# Patient Record
Sex: MALE | Race: WHITE | NOT HISPANIC OR LATINO | Employment: PART TIME | URBAN - METROPOLITAN AREA
[De-identification: names, ages, dates, MRNs, and addresses within clinical notes are randomized per-mention and may not be internally consistent; named-entity substitution may affect disease eponyms.]

---

## 2018-02-26 ENCOUNTER — OFFICE VISIT (OUTPATIENT)
Dept: RETAIL CLINIC | Facility: CLINIC | Age: 23
End: 2018-02-26

## 2018-02-26 VITALS
OXYGEN SATURATION: 98 % | DIASTOLIC BLOOD PRESSURE: 50 MMHG | HEART RATE: 59 BPM | SYSTOLIC BLOOD PRESSURE: 98 MMHG | RESPIRATION RATE: 18 BRPM | TEMPERATURE: 98.4 F

## 2018-02-26 DIAGNOSIS — H60.393 ACUTE INFECTIVE OTITIS EXTERNA, BILATERAL: ICD-10-CM

## 2018-02-26 DIAGNOSIS — H66.93 ACUTE OTITIS MEDIA, BILATERAL: Primary | ICD-10-CM

## 2018-02-26 PROCEDURE — 99203 OFFICE O/P NEW LOW 30 MIN: CPT | Performed by: NURSE PRACTITIONER

## 2018-02-26 PROCEDURE — 69209 REMOVE IMPACTED EAR WAX UNI: CPT | Performed by: NURSE PRACTITIONER

## 2018-02-26 RX ORDER — AMOXICILLIN 875 MG/1
875 TABLET, COATED ORAL EVERY 12 HOURS SCHEDULED
Qty: 20 TABLET | Refills: 0 | Status: SHIPPED | OUTPATIENT
Start: 2018-02-26 | End: 2018-03-08

## 2018-02-26 RX ORDER — OFLOXACIN 3 MG/ML
10 SOLUTION AURICULAR (OTIC) DAILY
Qty: 10 ML | Refills: 0 | Status: SHIPPED | OUTPATIENT
Start: 2018-02-26 | End: 2018-03-05

## 2018-02-26 NOTE — PROGRESS NOTES
Procedure   Ear Cerumen Removal Instrumentation  Date/Time: 2/26/2018 4:53 PM  Performed by: NANETTE AMAYA  Authorized by: NANETTE AMAYA   Consent: Verbal consent obtained. Written consent not obtained.  Risks and benefits: risks, benefits and alternatives were discussed  Consent given by: patient  Patient understanding: patient states understanding of the procedure being performed  Patient consent: the patient's understanding of the procedure matches consent given  Patient identity confirmed: verbally with patient and provided demographic data    Anesthesia:  Local Anesthetic: none  Cerumenolytics applied prior to procedure: patient used some prior to visit.  Location details: left ear and right ear  Procedure type: irrigation  Procedure type comments: warm water    Sedation:  Patient sedated: no  Patient tolerance: Patient tolerated the procedure well with no immediate complications  Comments: Unable to irrigate all cerumen out of bilateral ears. Left canal has about 1/4 amount of cerumen that remains, right canal has about 1/2 amount of cerumen that remains. Advised to make an appt. With ENT to remove remaining and to f/u on infection. Patient verbalizes understanding.

## 2018-02-26 NOTE — PATIENT INSTRUCTIONS
"Otitis Externa  Otitis externa is an infection of the outer ear canal. The outer ear canal is the area between the outside of the ear and the eardrum. Otitis externa is sometimes called \"swimmer's ear.\"  What are the causes?  This condition may be caused by:  · Swimming in dirty water.  · Moisture in the ear.  · An injury to the inside of the ear.  · An object stuck in the ear.  · A cut or scrape on the outside of the ear.  What increases the risk?  This condition is more likely to develop in swimmers.  What are the signs or symptoms?  The first symptom of this condition is often itching in the ear. Later signs and symptoms include:  · Swelling of the ear.  · Redness in the ear.  · Ear pain. The pain may get worse when you pull on your ear.  · Pus coming from the ear.  How is this diagnosed?  This condition may be diagnosed by examining the ear and testing fluid from the ear for bacteria and funguses.  How is this treated?  This condition may be treated with:  · Antibiotic ear drops. These are often given for 10-14 days.  · Medicine to reduce itching and swelling.  Follow these instructions at home:  · If you were prescribed antibiotic ear drops, apply them as told by your health care provider. Do not stop using the antibiotic even if your condition improves.  · Take over-the-counter and prescription medicines only as told by your health care provider.  · Keep all follow-up visits as told by your health care provider. This is important.  How is this prevented?  · Keep your ear dry. Use the corner of a towel to dry your ear after you swim or bathe.  · Avoid scratching or putting things in your ear. Doing these things can damage the ear canal or remove the protective wax that lines it, which makes it easier for bacteria and funguses to grow.  · Avoid swimming in lakes, polluted water, or pools that may not have the right amount of chlorine.  · Consider making ear drops and putting 3 or 4 drops in each ear after you " swim. Ask your health care provider about how you can make ear drops.  Contact a health care provider if:  · You have a fever.  · After 3 days your ear is still red, swollen, painful, or draining pus.  · Your redness, swelling, or pain gets worse.  · You have a severe headache.  · You have redness, swelling, pain, or tenderness in the area behind your ear.  This information is not intended to replace advice given to you by your health care provider. Make sure you discuss any questions you have with your health care provider.  Document Released: 12/18/2006 Document Revised: 01/24/2017 Document Reviewed: 09/26/2016  [employer] Interactive Patient Education © 2017 [employer]  Otitis Media, Adult  Otitis media occurs when there is inflammation and fluid in the middle ear. Your middle ear is a part of the ear that contains bones for hearing as well as air that helps send sounds to your brain.  What are the causes?  This condition is caused by a blockage in the eustachian tube. This tube drains fluid from the ear to the back of the nose (nasopharynx). A blockage in this tube can be caused by an object or by swelling (edema) in the tube. Problems that can cause a blockage include:  · A cold or other upper respiratory infection.  · Allergies.  · An irritant, such as tobacco smoke.  · Enlarged adenoids. The adenoids are areas of soft tissue located high in the back of the throat, behind the nose and the roof of the mouth.  · A mass in the nasopharynx.  · Damage to the ear caused by pressure changes (barotrauma).  What are the signs or symptoms?  Symptoms of this condition include:  · Ear pain.  · A fever.  · Decreased hearing.  · A headache.  · Tiredness (lethargy).  · Fluid leaking from the ear.  · Ringing in the ear.  How is this diagnosed?  This condition is diagnosed with a physical exam. During the exam your health care provider will use an instrument called an otoscope to look into your ear and check for redness,  swelling, and fluid. He or she will also ask about your symptoms.  Your health care provider may also order tests, such as:  · A test to check the movement of the eardrum (pneumatic otoscopy). This test is done by squeezing a small amount of air into the ear.  · A test that changes air pressure in the middle ear to check how well the eardrum moves and whether the eustachian tube is working (tympanogram).  How is this treated?  This condition usually goes away on its own within 3-5 days. But if the condition is caused by a bacteria infection and does not go away own its own, or keeps coming back, your health care provider may:  · Prescribe antibiotic medicines to treat the infection.  · Prescribe or recommend medicines to control pain.  Follow these instructions at home:  · Take over-the-counter and prescription medicines only as told by your health care provider.  · If you were prescribed an antibiotic medicine, take it as told by your health care provider. Do not stop taking the antibiotic even if you start to feel better.  · Keep all follow-up visits as told by your health care provider. This is important.  Contact a health care provider if:  · You have bleeding from your nose.  · There is a lump on your neck.  · You are not getting better in 5 days.  · You feel worse instead of better.  Get help right away if:  · You have severe pain that is not controlled with medicine.  · You have swelling, redness, or pain around your ear.  · You have stiffness in your neck.  · A part of your face is paralyzed.  · The bone behind your ear (mastoid) is tender when you touch it.  · You develop a severe headache.  Summary  · Otitis media is redness, soreness, and swelling of the middle ear.  · This condition usually goes away on its own within 3-5 days.  · If the problem does not go away in 3-5 days, your health care provider may prescribe or recommend medicines to treat your symptoms.  · If you were prescribed an antibiotic  medicine, take it as told by your health care provider.  This information is not intended to replace advice given to you by your health care provider. Make sure you discuss any questions you have with your health care provider.  Document Released: 09/22/2005 Document Revised: 12/08/2017 Document Reviewed: 12/08/2017  [employer] Interactive Patient Education © 2017 [employer]

## 2018-02-26 NOTE — PROGRESS NOTES
Subjective   Patient ID: Mando Hawk is a 23 y.o. male presents with   Chief Complaint   Patient presents with   • Earache     x 2 days   • Nasal Congestion     post nasal drip        Earache    There is pain in both ears. This is a new problem. The current episode started in the past 7 days. The problem occurs constantly. The problem has been gradually worsening. There has been no fever. The pain is at a severity of 3/10. Associated symptoms include coughing and headaches (occasionally). Pertinent negatives include no rhinorrhea or sore throat. He has tried nothing for the symptoms. The treatment provided no relief. There is no history of a chronic ear infection, hearing loss or a tympanostomy tube.       No Known Allergies    The following portions of the patient's history were reviewed and updated as appropriate: allergies, current medications, past family history, past medical history, past social history, past surgical history and problem list.      Review of Systems   Constitutional: Positive for fatigue. Negative for chills, diaphoresis and fever.   HENT: Positive for congestion, ear pain, postnasal drip, sinus pain and sinus pressure. Negative for rhinorrhea, sneezing and sore throat.    Respiratory: Positive for cough. Negative for chest tightness, shortness of breath and wheezing.    Cardiovascular: Negative.    Gastrointestinal: Negative.    Neurological: Positive for headaches (occasionally).       Objective     Vitals:    02/26/18 1610   BP: 98/50   Pulse: 59   Resp: 18   Temp: 98.4 °F (36.9 °C)   SpO2: 98%         Physical Exam   Constitutional: He is oriented to person, place, and time. He appears well-developed and well-nourished. He does not appear ill. No distress.   HENT:   Head: Normocephalic.   Right Ear: External ear normal. Tympanic membrane is erythematous. Decreased hearing (mild) is noted.   Left Ear: External ear normal. Tympanic membrane is erythematous. Decreased hearing (mild) is noted.  "  Nose: Mucosal edema present. No rhinorrhea or sinus tenderness.   Mouth/Throat: Oropharynx is clear and moist and mucous membranes are normal. Tonsils are 2+ on the right. Tonsils are 2+ on the left. No tonsillar exudate.   Bilateral canals with scant white exudate, tenderness, erythema and mild edema.  Hearing improved post irrigation   Eyes: Conjunctivae are normal.   Sclera white.   Neck: No tracheal deviation present.   Cardiovascular: Normal rate, regular rhythm, S1 normal, S2 normal and normal heart sounds.    Pulmonary/Chest: Effort normal and breath sounds normal. No accessory muscle usage. No respiratory distress.   Abdominal: Soft. Bowel sounds are normal. There is no tenderness.   Lymphadenopathy:     He has no cervical adenopathy.   Neurological: He is alert and oriented to person, place, and time.   Skin: Skin is warm and dry.   Vitals reviewed.        Mando was seen today for earache and nasal congestion.    Diagnoses and all orders for this visit:    Acute otitis media, bilateral  -     amoxicillin (AMOXIL) 875 MG tablet; Take 1 tablet by mouth Every 12 (Twelve) Hours for 10 days.  -     Ear Cerumen Removal    Acute infective otitis externa, bilateral  -     ofloxacin (FLOXIN OTIC) 0.3 % otic solution; Administer 10 drops into both ears Daily for 7 days.  -     Ear Cerumen Removal        Follow-up with Primary Care Physician in 48-72 hours if these symptoms worsen or fail to improve as anticipated. Patient verbalizes understanding.    Otitis Externa  Otitis externa is an infection of the outer ear canal. The outer ear canal is the area between the outside of the ear and the eardrum. Otitis externa is sometimes called \"swimmer's ear.\"  What are the causes?  This condition may be caused by:  · Swimming in dirty water.  · Moisture in the ear.  · An injury to the inside of the ear.  · An object stuck in the ear.  · A cut or scrape on the outside of the ear.  What increases the risk?  This condition is " more likely to develop in swimmers.  What are the signs or symptoms?  The first symptom of this condition is often itching in the ear. Later signs and symptoms include:  · Swelling of the ear.  · Redness in the ear.  · Ear pain. The pain may get worse when you pull on your ear.  · Pus coming from the ear.  How is this diagnosed?  This condition may be diagnosed by examining the ear and testing fluid from the ear for bacteria and funguses.  How is this treated?  This condition may be treated with:  · Antibiotic ear drops. These are often given for 10-14 days.  · Medicine to reduce itching and swelling.  Follow these instructions at home:  · If you were prescribed antibiotic ear drops, apply them as told by your health care provider. Do not stop using the antibiotic even if your condition improves.  · Take over-the-counter and prescription medicines only as told by your health care provider.  · Keep all follow-up visits as told by your health care provider. This is important.  How is this prevented?  · Keep your ear dry. Use the corner of a towel to dry your ear after you swim or bathe.  · Avoid scratching or putting things in your ear. Doing these things can damage the ear canal or remove the protective wax that lines it, which makes it easier for bacteria and funguses to grow.  · Avoid swimming in lakes, polluted water, or pools that may not have the right amount of chlorine.  · Consider making ear drops and putting 3 or 4 drops in each ear after you swim. Ask your health care provider about how you can make ear drops.  Contact a health care provider if:  · You have a fever.  · After 3 days your ear is still red, swollen, painful, or draining pus.  · Your redness, swelling, or pain gets worse.  · You have a severe headache.  · You have redness, swelling, pain, or tenderness in the area behind your ear.  This information is not intended to replace advice given to you by your health care provider. Make sure you  discuss any questions you have with your health care provider.  Document Released: 12/18/2006 Document Revised: 01/24/2017 Document Reviewed: 09/26/2016  [employer] Interactive Patient Education © 2017 [employer]  Otitis Media, Adult  Otitis media occurs when there is inflammation and fluid in the middle ear. Your middle ear is a part of the ear that contains bones for hearing as well as air that helps send sounds to your brain.  What are the causes?  This condition is caused by a blockage in the eustachian tube. This tube drains fluid from the ear to the back of the nose (nasopharynx). A blockage in this tube can be caused by an object or by swelling (edema) in the tube. Problems that can cause a blockage include:  · A cold or other upper respiratory infection.  · Allergies.  · An irritant, such as tobacco smoke.  · Enlarged adenoids. The adenoids are areas of soft tissue located high in the back of the throat, behind the nose and the roof of the mouth.  · A mass in the nasopharynx.  · Damage to the ear caused by pressure changes (barotrauma).  What are the signs or symptoms?  Symptoms of this condition include:  · Ear pain.  · A fever.  · Decreased hearing.  · A headache.  · Tiredness (lethargy).  · Fluid leaking from the ear.  · Ringing in the ear.  How is this diagnosed?  This condition is diagnosed with a physical exam. During the exam your health care provider will use an instrument called an otoscope to look into your ear and check for redness, swelling, and fluid. He or she will also ask about your symptoms.  Your health care provider may also order tests, such as:  · A test to check the movement of the eardrum (pneumatic otoscopy). This test is done by squeezing a small amount of air into the ear.  · A test that changes air pressure in the middle ear to check how well the eardrum moves and whether the eustachian tube is working (tympanogram).  How is this treated?  This condition usually goes away on its  own within 3-5 days. But if the condition is caused by a bacteria infection and does not go away own its own, or keeps coming back, your health care provider may:  · Prescribe antibiotic medicines to treat the infection.  · Prescribe or recommend medicines to control pain.  Follow these instructions at home:  · Take over-the-counter and prescription medicines only as told by your health care provider.  · If you were prescribed an antibiotic medicine, take it as told by your health care provider. Do not stop taking the antibiotic even if you start to feel better.  · Keep all follow-up visits as told by your health care provider. This is important.  Contact a health care provider if:  · You have bleeding from your nose.  · There is a lump on your neck.  · You are not getting better in 5 days.  · You feel worse instead of better.  Get help right away if:  · You have severe pain that is not controlled with medicine.  · You have swelling, redness, or pain around your ear.  · You have stiffness in your neck.  · A part of your face is paralyzed.  · The bone behind your ear (mastoid) is tender when you touch it.  · You develop a severe headache.  Summary  · Otitis media is redness, soreness, and swelling of the middle ear.  · This condition usually goes away on its own within 3-5 days.  · If the problem does not go away in 3-5 days, your health care provider may prescribe or recommend medicines to treat your symptoms.  · If you were prescribed an antibiotic medicine, take it as told by your health care provider.  This information is not intended to replace advice given to you by your health care provider. Make sure you discuss any questions you have with your health care provider.  Document Released: 09/22/2005 Document Revised: 12/08/2017 Document Reviewed: 12/08/2017  [employer] Interactive Patient Education © 2017 [employer]

## 2018-12-14 ENCOUNTER — OFFICE VISIT CONVERTED (OUTPATIENT)
Dept: FAMILY MEDICINE CLINIC | Facility: CLINIC | Age: 23
End: 2018-12-14
Attending: NURSE PRACTITIONER

## 2021-05-09 VITALS
WEIGHT: 208 LBS | HEIGHT: 69 IN | OXYGEN SATURATION: 98 % | BODY MASS INDEX: 30.81 KG/M2 | DIASTOLIC BLOOD PRESSURE: 60 MMHG | HEART RATE: 97 BPM | SYSTOLIC BLOOD PRESSURE: 108 MMHG | TEMPERATURE: 98 F

## 2022-03-29 ENCOUNTER — HOSPITAL ENCOUNTER (EMERGENCY)
Facility: HOSPITAL | Age: 27
Discharge: HOME | End: 2022-03-29
Attending: EMERGENCY MEDICINE | Admitting: EMERGENCY MEDICINE

## 2022-03-29 ENCOUNTER — APPOINTMENT (OUTPATIENT)
Dept: CT IMAGING | Facility: HOSPITAL | Age: 27
End: 2022-03-29

## 2022-03-29 ENCOUNTER — APPOINTMENT (OUTPATIENT)
Dept: ULTRASOUND IMAGING | Facility: HOSPITAL | Age: 27
End: 2022-03-29

## 2022-03-29 VITALS
RESPIRATION RATE: 18 BRPM | HEIGHT: 69 IN | SYSTOLIC BLOOD PRESSURE: 138 MMHG | HEART RATE: 63 BPM | BODY MASS INDEX: 34.09 KG/M2 | TEMPERATURE: 98.3 F | WEIGHT: 230.16 LBS | OXYGEN SATURATION: 99 % | DIASTOLIC BLOOD PRESSURE: 66 MMHG

## 2022-03-29 DIAGNOSIS — N50.82 SCROTAL PAIN: Primary | ICD-10-CM

## 2022-03-29 LAB
ALBUMIN SERPL-MCNC: 4.5 G/DL (ref 3.5–5.2)
ALBUMIN/GLOB SERPL: 1.8 G/DL
ALP SERPL-CCNC: 61 U/L (ref 39–117)
ALT SERPL W P-5'-P-CCNC: 65 U/L (ref 1–41)
ANION GAP SERPL CALCULATED.3IONS-SCNC: 12.2 MMOL/L (ref 5–15)
AST SERPL-CCNC: 24 U/L (ref 1–40)
BASOPHILS # BLD AUTO: 0.01 10*3/MM3 (ref 0–0.2)
BASOPHILS NFR BLD AUTO: 0.1 % (ref 0–1.5)
BILIRUB SERPL-MCNC: 0.3 MG/DL (ref 0–1.2)
BILIRUB UR QL STRIP: NEGATIVE
BUN SERPL-MCNC: 19 MG/DL (ref 6–20)
BUN/CREAT SERPL: 20.4 (ref 7–25)
CALCIUM SPEC-SCNC: 9.6 MG/DL (ref 8.6–10.5)
CHLORIDE SERPL-SCNC: 101 MMOL/L (ref 98–107)
CLARITY UR: ABNORMAL
CO2 SERPL-SCNC: 25.8 MMOL/L (ref 22–29)
COLOR UR: YELLOW
CREAT SERPL-MCNC: 0.93 MG/DL (ref 0.76–1.27)
DEPRECATED RDW RBC AUTO: 41.1 FL (ref 37–54)
EGFRCR SERPLBLD CKD-EPI 2021: 114.7 ML/MIN/1.73
EOSINOPHIL # BLD AUTO: 0.36 10*3/MM3 (ref 0–0.4)
EOSINOPHIL NFR BLD AUTO: 3 % (ref 0.3–6.2)
ERYTHROCYTE [DISTWIDTH] IN BLOOD BY AUTOMATED COUNT: 13.2 % (ref 12.3–15.4)
GLOBULIN UR ELPH-MCNC: 2.5 GM/DL
GLUCOSE SERPL-MCNC: 110 MG/DL (ref 65–99)
GLUCOSE UR STRIP-MCNC: NEGATIVE MG/DL
HCT VFR BLD AUTO: 44.9 % (ref 37.5–51)
HGB BLD-MCNC: 15.5 G/DL (ref 13–17.7)
HGB UR QL STRIP.AUTO: NEGATIVE
IMM GRANULOCYTES # BLD AUTO: 0.04 10*3/MM3 (ref 0–0.05)
IMM GRANULOCYTES NFR BLD AUTO: 0.3 % (ref 0–0.5)
KETONES UR QL STRIP: NEGATIVE
LEUKOCYTE ESTERASE UR QL STRIP.AUTO: NEGATIVE
LYMPHOCYTES # BLD AUTO: 4.15 10*3/MM3 (ref 0.7–3.1)
LYMPHOCYTES NFR BLD AUTO: 34.7 % (ref 19.6–45.3)
MCH RBC QN AUTO: 29.5 PG (ref 26.6–33)
MCHC RBC AUTO-ENTMCNC: 34.5 G/DL (ref 31.5–35.7)
MCV RBC AUTO: 85.4 FL (ref 79–97)
MONOCYTES # BLD AUTO: 1.13 10*3/MM3 (ref 0.1–0.9)
MONOCYTES NFR BLD AUTO: 9.4 % (ref 5–12)
NEUTROPHILS NFR BLD AUTO: 52.5 % (ref 42.7–76)
NEUTROPHILS NFR BLD AUTO: 6.27 10*3/MM3 (ref 1.7–7)
NITRITE UR QL STRIP: NEGATIVE
NRBC BLD AUTO-RTO: 0 /100 WBC (ref 0–0.2)
PH UR STRIP.AUTO: 7.5 [PH] (ref 5–8)
PLATELET # BLD AUTO: 243 10*3/MM3 (ref 140–450)
PMV BLD AUTO: 10.5 FL (ref 6–12)
POTASSIUM SERPL-SCNC: 3.6 MMOL/L (ref 3.5–5.2)
PROT SERPL-MCNC: 7 G/DL (ref 6–8.5)
PROT UR QL STRIP: NEGATIVE
RBC # BLD AUTO: 5.26 10*6/MM3 (ref 4.14–5.8)
SODIUM SERPL-SCNC: 139 MMOL/L (ref 136–145)
SP GR UR STRIP: 1.02 (ref 1–1.03)
UROBILINOGEN UR QL STRIP: ABNORMAL
WBC NRBC COR # BLD: 11.96 10*3/MM3 (ref 3.4–10.8)

## 2022-03-29 PROCEDURE — 80053 COMPREHEN METABOLIC PANEL: CPT | Performed by: EMERGENCY MEDICINE

## 2022-03-29 PROCEDURE — 99283 EMERGENCY DEPT VISIT LOW MDM: CPT

## 2022-03-29 PROCEDURE — 85025 COMPLETE CBC W/AUTO DIFF WBC: CPT | Performed by: EMERGENCY MEDICINE

## 2022-03-29 PROCEDURE — 81003 URINALYSIS AUTO W/O SCOPE: CPT | Performed by: EMERGENCY MEDICINE

## 2022-03-29 PROCEDURE — 76870 US EXAM SCROTUM: CPT

## 2022-03-29 PROCEDURE — 74176 CT ABD & PELVIS W/O CONTRAST: CPT

## 2022-03-29 RX ORDER — DIFLUNISAL 500 MG/1
500 TABLET, FILM COATED ORAL 2 TIMES DAILY PRN
Qty: 20 TABLET | Refills: 0 | Status: SHIPPED | OUTPATIENT
Start: 2022-03-29 | End: 2022-05-09

## 2022-08-02 ENCOUNTER — OFFICE VISIT (OUTPATIENT)
Dept: FAMILY MEDICINE CLINIC | Facility: CLINIC | Age: 27
End: 2022-08-02

## 2022-08-02 VITALS
SYSTOLIC BLOOD PRESSURE: 118 MMHG | WEIGHT: 230 LBS | OXYGEN SATURATION: 98 % | DIASTOLIC BLOOD PRESSURE: 64 MMHG | HEIGHT: 69 IN | TEMPERATURE: 97.7 F | BODY MASS INDEX: 34.07 KG/M2 | HEART RATE: 78 BPM

## 2022-08-02 DIAGNOSIS — F41.9 ANXIETY AND DEPRESSION: ICD-10-CM

## 2022-08-02 DIAGNOSIS — K21.9 GASTROESOPHAGEAL REFLUX DISEASE, UNSPECIFIED WHETHER ESOPHAGITIS PRESENT: ICD-10-CM

## 2022-08-02 DIAGNOSIS — R53.83 FATIGUE, UNSPECIFIED TYPE: ICD-10-CM

## 2022-08-02 DIAGNOSIS — R19.7 DIARRHEA, UNSPECIFIED TYPE: Primary | ICD-10-CM

## 2022-08-02 DIAGNOSIS — F32.A ANXIETY AND DEPRESSION: ICD-10-CM

## 2022-08-02 LAB
ALBUMIN SERPL-MCNC: 4.6 G/DL (ref 3.5–5.2)
ALBUMIN/GLOB SERPL: 2.2 G/DL
ALP SERPL-CCNC: 52 U/L (ref 39–117)
ALT SERPL W P-5'-P-CCNC: 53 U/L (ref 1–41)
ANION GAP SERPL CALCULATED.3IONS-SCNC: 11 MMOL/L (ref 5–15)
AST SERPL-CCNC: 20 U/L (ref 1–40)
BASOPHILS # BLD AUTO: 0.02 10*3/MM3 (ref 0–0.2)
BASOPHILS NFR BLD AUTO: 0.2 % (ref 0–1.5)
BILIRUB SERPL-MCNC: 0.3 MG/DL (ref 0–1.2)
BUN SERPL-MCNC: 15 MG/DL (ref 6–20)
BUN/CREAT SERPL: 18.5 (ref 7–25)
CALCIUM SPEC-SCNC: 9.4 MG/DL (ref 8.6–10.5)
CHLORIDE SERPL-SCNC: 105 MMOL/L (ref 98–107)
CO2 SERPL-SCNC: 26 MMOL/L (ref 22–29)
CREAT SERPL-MCNC: 0.81 MG/DL (ref 0.76–1.27)
DEPRECATED RDW RBC AUTO: 40.5 FL (ref 37–54)
EGFRCR SERPLBLD CKD-EPI 2021: 123.2 ML/MIN/1.73
EOSINOPHIL # BLD AUTO: 0.45 10*3/MM3 (ref 0–0.4)
EOSINOPHIL NFR BLD AUTO: 4.1 % (ref 0.3–6.2)
ERYTHROCYTE [DISTWIDTH] IN BLOOD BY AUTOMATED COUNT: 13.1 % (ref 12.3–15.4)
FOLATE SERPL-MCNC: 14.4 NG/ML (ref 4.78–24.2)
GLOBULIN UR ELPH-MCNC: 2.1 GM/DL
GLUCOSE SERPL-MCNC: 85 MG/DL (ref 65–99)
HCT VFR BLD AUTO: 43.4 % (ref 37.5–51)
HGB BLD-MCNC: 14.7 G/DL (ref 13–17.7)
IMM GRANULOCYTES # BLD AUTO: 0.04 10*3/MM3 (ref 0–0.05)
IMM GRANULOCYTES NFR BLD AUTO: 0.4 % (ref 0–0.5)
LYMPHOCYTES # BLD AUTO: 3.42 10*3/MM3 (ref 0.7–3.1)
LYMPHOCYTES NFR BLD AUTO: 31.1 % (ref 19.6–45.3)
MCH RBC QN AUTO: 29.1 PG (ref 26.6–33)
MCHC RBC AUTO-ENTMCNC: 33.9 G/DL (ref 31.5–35.7)
MCV RBC AUTO: 85.8 FL (ref 79–97)
MONOCYTES # BLD AUTO: 0.86 10*3/MM3 (ref 0.1–0.9)
MONOCYTES NFR BLD AUTO: 7.8 % (ref 5–12)
NEUTROPHILS NFR BLD AUTO: 56.4 % (ref 42.7–76)
NEUTROPHILS NFR BLD AUTO: 6.21 10*3/MM3 (ref 1.7–7)
NRBC BLD AUTO-RTO: 0 /100 WBC (ref 0–0.2)
PLATELET # BLD AUTO: 250 10*3/MM3 (ref 140–450)
PMV BLD AUTO: 11.2 FL (ref 6–12)
POTASSIUM SERPL-SCNC: 4.5 MMOL/L (ref 3.5–5.2)
PROT SERPL-MCNC: 6.7 G/DL (ref 6–8.5)
RBC # BLD AUTO: 5.06 10*6/MM3 (ref 4.14–5.8)
SODIUM SERPL-SCNC: 142 MMOL/L (ref 136–145)
T4 FREE SERPL-MCNC: 1.27 NG/DL (ref 0.93–1.7)
TSH SERPL DL<=0.05 MIU/L-ACNC: 1.6 UIU/ML (ref 0.27–4.2)
VIT B12 BLD-MCNC: 309 PG/ML (ref 211–946)
WBC NRBC COR # BLD: 11 10*3/MM3 (ref 3.4–10.8)

## 2022-08-02 PROCEDURE — 99203 OFFICE O/P NEW LOW 30 MIN: CPT | Performed by: FAMILY MEDICINE

## 2022-08-02 PROCEDURE — 84439 ASSAY OF FREE THYROXINE: CPT | Performed by: FAMILY MEDICINE

## 2022-08-02 PROCEDURE — 36415 COLL VENOUS BLD VENIPUNCTURE: CPT | Performed by: FAMILY MEDICINE

## 2022-08-02 PROCEDURE — 82607 VITAMIN B-12: CPT | Performed by: FAMILY MEDICINE

## 2022-08-02 PROCEDURE — 82746 ASSAY OF FOLIC ACID SERUM: CPT | Performed by: FAMILY MEDICINE

## 2022-08-02 PROCEDURE — 80050 GENERAL HEALTH PANEL: CPT | Performed by: FAMILY MEDICINE

## 2022-08-02 RX ORDER — AMOXICILLIN 875 MG/1
875 TABLET, COATED ORAL 2 TIMES DAILY
COMMUNITY
Start: 2022-07-21 | End: 2022-08-02

## 2022-08-02 RX ORDER — FAMOTIDINE 40 MG/1
40 TABLET, FILM COATED ORAL DAILY
Qty: 30 TABLET | Refills: 5 | Status: SHIPPED | OUTPATIENT
Start: 2022-08-02

## 2022-08-02 RX ORDER — SACCHAROMYCES BOULARDII 250 MG
250 CAPSULE ORAL DAILY
Qty: 30 CAPSULE | Refills: 0 | Status: SHIPPED | OUTPATIENT
Start: 2022-08-02 | End: 2022-08-12

## 2022-08-02 NOTE — PROGRESS NOTES
Chief Complaint  GI Problem (GI issues going on for a couple of months. Mental issues going on for years.  He would like to discuss both.  )    Subjective          Mando Hawk presents to [facility]  Pt having soft stool, watery brown diarrhea intermittent x 3-4 months    Pt brother  in 2022- sudden- pt getting counseling for this- pt has tried cymbalta and no longer taking- cymbalta did help some- has had problems with anxiety and depression for several yrs    Depression  Visit Type: initial  Onset of symptoms: more than 5 years ago  Progression since onset: gradually worsening  Patient presents with the following symptoms: decreased concentration, depressed mood, fatigue and insomnia.  Patient is not experiencing: anhedonia, chest pain, choking sensation, compulsions, confusion, dizziness, dry mouth, excessive worry, feelings of hopelessness, feelings of worthlessness, hypersomnia, hyperventilation, irritability, malaise, memory impairment, muscle tension, nausea, nervousness/anxiety, obsessions, palpitations, panic, psychomotor agitation, psychomotor retardation, restlessness, shortness of breath, suicidal ideas, suicidal planning, thoughts of death, weight gain and weight loss.  Frequency of symptoms: constantly   Severity: moderate   Sleep quality: poor  Nighttime awakenings: several  Treatments tried: snri.  Compliance with treatment: good  Improvement on treatment: mild          Objective   No Known Allergies    There is no immunization history on file for this patient.  Past Medical History:   Diagnosis Date   • Anxiety    • Depression       History reviewed. No pertinent surgical history.   Social History     Socioeconomic History   • Marital status: Single   Tobacco Use   • Smoking status: Never Smoker   • Smokeless tobacco: Never Used   Vaping Use   • Vaping Use: Never used   Substance and Sexual Activity   • Alcohol use: Not Currently     Comment: soc.  "  • Drug use: No   • Sexual activity: Not Currently        Current Outpatient Medications:   •  famotidine (Pepcid) 40 MG tablet, Take 1 tablet by mouth Daily., Disp: 30 tablet, Rfl: 5  •  saccharomyces boulardii (Florastor) 250 MG capsule, Take 1 capsule by mouth Daily for 10 days., Disp: 30 capsule, Rfl: 0  •  sertraline (Zoloft) 50 MG tablet, Take 1 tablet by mouth Daily., Disp: 30 tablet, Rfl: 1   Family History   Problem Relation Age of Onset   • No Known Problems Mother    • No Known Problems Father           Vital Signs:   Vitals:    08/02/22 0930   BP: 118/64   BP Location: Left arm   Patient Position: Sitting   Cuff Size: Adult   Pulse: 78   Temp: 97.7 °F (36.5 °C)   TempSrc: Temporal   SpO2: 98%   Weight: 104 kg (230 lb)   Height: 175.3 cm (69\")       Review of Systems   Constitutional: Negative for irritability, weight gain and weight loss.   Respiratory: Negative for choking and shortness of breath.    Cardiovascular: Negative for palpitations.   Psychiatric/Behavioral: Positive for decreased concentration. Negative for confusion and suicidal ideas. The patient has insomnia. The patient is not nervous/anxious.       Physical Exam  Vitals reviewed.   Constitutional:       Appearance: Normal appearance. He is well-developed.   HENT:      Head: Normocephalic and atraumatic.      Right Ear: External ear normal.      Left Ear: External ear normal.      Mouth/Throat:      Pharynx: No oropharyngeal exudate.   Eyes:      Conjunctiva/sclera: Conjunctivae normal.      Pupils: Pupils are equal, round, and reactive to light.   Cardiovascular:      Rate and Rhythm: Normal rate and regular rhythm.      Pulses: Normal pulses.      Heart sounds: Normal heart sounds. No murmur heard.    No friction rub. No gallop.   Pulmonary:      Effort: Pulmonary effort is normal.      Breath sounds: Normal breath sounds. No wheezing or rhonchi.   Abdominal:      General: Abdomen is flat. Bowel sounds are normal. There is no " distension.      Palpations: Abdomen is soft. There is no mass.      Tenderness: There is no abdominal tenderness. There is no guarding or rebound.      Hernia: No hernia is present.   Musculoskeletal:         General: Normal range of motion.   Skin:     General: Skin is warm and dry.      Capillary Refill: Capillary refill takes less than 2 seconds.   Neurological:      General: No focal deficit present.      Mental Status: He is alert and oriented to person, place, and time.      Cranial Nerves: No cranial nerve deficit.   Psychiatric:         Mood and Affect: Mood and affect normal.         Behavior: Behavior normal.         Thought Content: Thought content normal.         Judgment: Judgment normal.        Result Review :                 Assessment and Plan    Diagnoses and all orders for this visit:    1. Diarrhea, unspecified type (Primary)  -     Enteric Bacterial Panel - Stool, Per Rectum  -     saccharomyces boulardii (Florastor) 250 MG capsule; Take 1 capsule by mouth Daily for 10 days.  Dispense: 30 capsule; Refill: 0    2. Anxiety and depression  -     sertraline (Zoloft) 50 MG tablet; Take 1 tablet by mouth Daily.  Dispense: 30 tablet; Refill: 1  -     Ambulatory Referral to Psychiatry    3. Gastroesophageal reflux disease, unspecified whether esophagitis present  -     famotidine (Pepcid) 40 MG tablet; Take 1 tablet by mouth Daily.  Dispense: 30 tablet; Refill: 5    4. Fatigue, unspecified type  -     CBC Auto Differential  -     Comprehensive Metabolic Panel  -     TSH+Free T4  -     Vitamin B12 & Folate            Follow Up   Return in about 3 weeks (around 8/23/2022) for Recheck.  Patient was given instructions and counseling regarding his condition or for health maintenance advice. Please see specific information pulled into the AVS if appropriate.

## 2022-08-02 NOTE — PROGRESS NOTES
Venipuncture Blood Specimen Collection  Venipuncture performed in left arm  by Pauline Huerta with good hemostasis. Patient tolerated the procedure well without complications.   08/02/22   Pauline Huerta

## 2022-08-04 NOTE — PROGRESS NOTES
You have some abnormal labs, including elevated WBC's and liver enzymes that need to be discussed.  Follow-up in 1-2 weeks to discuss.

## 2022-08-15 ENCOUNTER — OFFICE VISIT (OUTPATIENT)
Dept: FAMILY MEDICINE CLINIC | Facility: CLINIC | Age: 27
End: 2022-08-15

## 2022-08-15 VITALS
HEART RATE: 88 BPM | WEIGHT: 216.6 LBS | OXYGEN SATURATION: 100 % | SYSTOLIC BLOOD PRESSURE: 100 MMHG | BODY MASS INDEX: 31.99 KG/M2 | TEMPERATURE: 98.2 F | DIASTOLIC BLOOD PRESSURE: 60 MMHG

## 2022-08-15 DIAGNOSIS — D72.829 LEUKOCYTOSIS, UNSPECIFIED TYPE: ICD-10-CM

## 2022-08-15 DIAGNOSIS — F41.9 ANXIETY AND DEPRESSION: ICD-10-CM

## 2022-08-15 DIAGNOSIS — R74.8 ELEVATED LIVER ENZYMES: ICD-10-CM

## 2022-08-15 DIAGNOSIS — K59.00 CONSTIPATION, UNSPECIFIED CONSTIPATION TYPE: Primary | ICD-10-CM

## 2022-08-15 DIAGNOSIS — F32.A ANXIETY AND DEPRESSION: ICD-10-CM

## 2022-08-15 LAB
ALBUMIN SERPL-MCNC: 4.8 G/DL (ref 3.5–5.2)
ALBUMIN/GLOB SERPL: 2.2 G/DL
ALP SERPL-CCNC: 55 U/L (ref 39–117)
ALT SERPL W P-5'-P-CCNC: 39 U/L (ref 1–41)
ANION GAP SERPL CALCULATED.3IONS-SCNC: 14 MMOL/L (ref 5–15)
AST SERPL-CCNC: 19 U/L (ref 1–40)
BASOPHILS # BLD AUTO: 0.02 10*3/MM3 (ref 0–0.2)
BASOPHILS NFR BLD AUTO: 0.2 % (ref 0–1.5)
BILIRUB SERPL-MCNC: 0.8 MG/DL (ref 0–1.2)
BUN SERPL-MCNC: 14 MG/DL (ref 6–20)
BUN/CREAT SERPL: 16.5 (ref 7–25)
CALCIUM SPEC-SCNC: 9.7 MG/DL (ref 8.6–10.5)
CHLORIDE SERPL-SCNC: 103 MMOL/L (ref 98–107)
CO2 SERPL-SCNC: 21 MMOL/L (ref 22–29)
CREAT SERPL-MCNC: 0.85 MG/DL (ref 0.76–1.27)
DEPRECATED RDW RBC AUTO: 39.6 FL (ref 37–54)
EGFRCR SERPLBLD CKD-EPI 2021: 121.4 ML/MIN/1.73
EOSINOPHIL # BLD AUTO: 0.15 10*3/MM3 (ref 0–0.4)
EOSINOPHIL NFR BLD AUTO: 1.7 % (ref 0.3–6.2)
ERYTHROCYTE [DISTWIDTH] IN BLOOD BY AUTOMATED COUNT: 13.1 % (ref 12.3–15.4)
GLOBULIN UR ELPH-MCNC: 2.2 GM/DL
GLUCOSE SERPL-MCNC: 83 MG/DL (ref 65–99)
HCT VFR BLD AUTO: 44.6 % (ref 37.5–51)
HGB BLD-MCNC: 15.5 G/DL (ref 13–17.7)
IMM GRANULOCYTES # BLD AUTO: 0.04 10*3/MM3 (ref 0–0.05)
IMM GRANULOCYTES NFR BLD AUTO: 0.4 % (ref 0–0.5)
LYMPHOCYTES # BLD AUTO: 2.92 10*3/MM3 (ref 0.7–3.1)
LYMPHOCYTES NFR BLD AUTO: 32.3 % (ref 19.6–45.3)
MCH RBC QN AUTO: 29 PG (ref 26.6–33)
MCHC RBC AUTO-ENTMCNC: 34.8 G/DL (ref 31.5–35.7)
MCV RBC AUTO: 83.4 FL (ref 79–97)
MONOCYTES # BLD AUTO: 0.95 10*3/MM3 (ref 0.1–0.9)
MONOCYTES NFR BLD AUTO: 10.5 % (ref 5–12)
NEUTROPHILS NFR BLD AUTO: 4.96 10*3/MM3 (ref 1.7–7)
NEUTROPHILS NFR BLD AUTO: 54.9 % (ref 42.7–76)
NRBC BLD AUTO-RTO: 0 /100 WBC (ref 0–0.2)
PLATELET # BLD AUTO: 248 10*3/MM3 (ref 140–450)
PMV BLD AUTO: 11.2 FL (ref 6–12)
POTASSIUM SERPL-SCNC: 4.1 MMOL/L (ref 3.5–5.2)
PROT SERPL-MCNC: 7 G/DL (ref 6–8.5)
RBC # BLD AUTO: 5.35 10*6/MM3 (ref 4.14–5.8)
SODIUM SERPL-SCNC: 138 MMOL/L (ref 136–145)
WBC NRBC COR # BLD: 9.04 10*3/MM3 (ref 3.4–10.8)

## 2022-08-15 PROCEDURE — 85025 COMPLETE CBC W/AUTO DIFF WBC: CPT | Performed by: FAMILY MEDICINE

## 2022-08-15 PROCEDURE — 99213 OFFICE O/P EST LOW 20 MIN: CPT | Performed by: FAMILY MEDICINE

## 2022-08-15 PROCEDURE — 80053 COMPREHEN METABOLIC PANEL: CPT | Performed by: FAMILY MEDICINE

## 2022-08-15 PROCEDURE — 36415 COLL VENOUS BLD VENIPUNCTURE: CPT | Performed by: FAMILY MEDICINE

## 2022-08-15 RX ORDER — ESCITALOPRAM OXALATE 10 MG/1
10 TABLET ORAL DAILY
Qty: 30 TABLET | Refills: 1 | Status: SHIPPED | OUTPATIENT
Start: 2022-08-15 | End: 2022-08-29

## 2022-08-15 NOTE — PROGRESS NOTES
Chief Complaint  GI Problem (Stomach trouble from Zoloft )    Subjective          Mando Hawk presents to [facility]  Pt tried zoloft and had trouble with IBS with diarrhea and constipation- pt went off zoloft  Discussed labs  Leukocytosis- will recheck and if still elevated, will refer to hematology  Elevated ;liver enzymes      Objective   No Known Allergies    There is no immunization history on file for this patient.  Past Medical History:   Diagnosis Date   • Anxiety    • Depression       History reviewed. No pertinent surgical history.   Social History     Socioeconomic History   • Marital status: Single   Tobacco Use   • Smoking status: Never Smoker   • Smokeless tobacco: Never Used   Vaping Use   • Vaping Use: Never used   Substance and Sexual Activity   • Alcohol use: Not Currently     Comment: soc.   • Drug use: No   • Sexual activity: Not Currently        Current Outpatient Medications:   •  escitalopram (Lexapro) 10 MG tablet, Take 1 tablet by mouth Daily., Disp: 30 tablet, Rfl: 1  •  famotidine (Pepcid) 40 MG tablet, Take 1 tablet by mouth Daily., Disp: 30 tablet, Rfl: 5  •  linaclotide (Linzess) 145 MCG capsule capsule, Take 1 capsule by mouth Every Morning Before Breakfast., Disp: 30 capsule, Rfl: 1   Family History   Problem Relation Age of Onset   • No Known Problems Mother    • No Known Problems Father           Vital Signs:   Vitals:    08/15/22 1419   BP: 100/60   Pulse: 88   Temp: 98.2 °F (36.8 °C)   SpO2: 100%   Weight: 98.2 kg (216 lb 9.6 oz)       Review of Systems   Physical Exam  Vitals reviewed.   Constitutional:       Appearance: Normal appearance. He is well-developed.   HENT:      Head: Normocephalic and atraumatic.      Right Ear: External ear normal.      Left Ear: External ear normal.      Mouth/Throat:      Pharynx: No oropharyngeal exudate.   Eyes:      Conjunctiva/sclera: Conjunctivae normal.      Pupils: Pupils are equal, round, and  reactive to light.   Cardiovascular:      Rate and Rhythm: Normal rate and regular rhythm.      Pulses: Normal pulses.      Heart sounds: Normal heart sounds. No murmur heard.    No friction rub. No gallop.   Pulmonary:      Effort: Pulmonary effort is normal.      Breath sounds: Normal breath sounds. No wheezing or rhonchi.   Abdominal:      General: Abdomen is flat. Bowel sounds are normal. There is no distension.      Palpations: Abdomen is soft. There is no mass.      Tenderness: There is no abdominal tenderness. There is no guarding or rebound.      Hernia: No hernia is present.   Musculoskeletal:         General: Normal range of motion.   Skin:     General: Skin is warm and dry.      Capillary Refill: Capillary refill takes less than 2 seconds.   Neurological:      General: No focal deficit present.      Mental Status: He is alert and oriented to person, place, and time.      Cranial Nerves: No cranial nerve deficit.   Psychiatric:         Mood and Affect: Mood and affect normal.         Behavior: Behavior normal.         Thought Content: Thought content normal.         Judgment: Judgment normal.        Result Review :   The following data was reviewed by: Jostin Medrano MD on 08/15/2022:  CMP    CMP 3/29/22 8/2/22   Glucose 110 (A) 85   BUN 19 15   Creatinine 0.93 0.81   Sodium 139 142   Potassium 3.6 4.5   Chloride 101 105   Calcium 9.6 9.4   Albumin 4.50 4.60   Total Bilirubin 0.3 0.3   Alkaline Phosphatase 61 52   AST (SGOT) 24 20   ALT (SGPT) 65 (A) 53 (A)   (A) Abnormal value            CBC    CBC 3/29/22 8/2/22   WBC 11.96 (A) 11.00 (A)   RBC 5.26 5.06   Hemoglobin 15.5 14.7   Hematocrit 44.9 43.4   MCV 85.4 85.8   MCH 29.5 29.1   MCHC 34.5 33.9   RDW 13.2 13.1   Platelets 243 250   (A) Abnormal value                TSH    TSH 8/2/22   TSH 1.600                     Assessment and Plan    Diagnoses and all orders for this visit:    1. Constipation, unspecified constipation type (Primary)  -      linaclotide (Linzess) 145 MCG capsule capsule; Take 1 capsule by mouth Every Morning Before Breakfast.  Dispense: 30 capsule; Refill: 1    2. Anxiety and depression  -     escitalopram (Lexapro) 10 MG tablet; Take 1 tablet by mouth Daily.  Dispense: 30 tablet; Refill: 1    3. Elevated liver enzymes  -     US Abdomen Limited; Future  -     Comprehensive Metabolic Panel    4. Leukocytosis, unspecified type  -     CBC Auto Differential            Follow Up   Return in about 1 month (around 9/15/2022), or if symptoms worsen or fail to improve.  Patient was given instructions and counseling regarding his condition or for health maintenance advice. Please see specific information pulled into the AVS if appropriate.     Pt feels safe- has no SI or HI.

## 2022-08-15 NOTE — PROGRESS NOTES
Venipuncture Blood Specimen Collection  Venipuncture performed in left arm  by Pauline Huerta with good hemostasis. Patient tolerated the procedure well without complications.   08/15/22   Pauline Huerta

## 2022-08-29 ENCOUNTER — OFFICE VISIT (OUTPATIENT)
Dept: PSYCHIATRY | Facility: CLINIC | Age: 27
End: 2022-08-29

## 2022-08-29 VITALS
HEIGHT: 69 IN | SYSTOLIC BLOOD PRESSURE: 122 MMHG | WEIGHT: 223.6 LBS | BODY MASS INDEX: 33.12 KG/M2 | DIASTOLIC BLOOD PRESSURE: 53 MMHG

## 2022-08-29 DIAGNOSIS — F41.1 GENERALIZED ANXIETY DISORDER: ICD-10-CM

## 2022-08-29 DIAGNOSIS — F43.21 GRIEF: ICD-10-CM

## 2022-08-29 DIAGNOSIS — F51.05 INSOMNIA DUE TO MENTAL DISORDER: ICD-10-CM

## 2022-08-29 DIAGNOSIS — F33.1 MAJOR DEPRESSIVE DISORDER, RECURRENT EPISODE, MODERATE: Primary | ICD-10-CM

## 2022-08-29 PROBLEM — F32.9 MAJOR DEPRESSION, SINGLE EPISODE: Status: ACTIVE | Noted: 2022-08-29

## 2022-08-29 PROBLEM — F32.A DEPRESSION: Status: ACTIVE | Noted: 2022-08-29

## 2022-08-29 PROBLEM — R12 HEARTBURN: Status: ACTIVE | Noted: 2022-08-29

## 2022-08-29 PROBLEM — I36.9 NONRHEUMATIC TRICUSPID VALVE DISORDER: Status: ACTIVE | Noted: 2022-08-29

## 2022-08-29 PROCEDURE — 90792 PSYCH DIAG EVAL W/MED SRVCS: CPT | Performed by: NURSE PRACTITIONER

## 2022-08-29 RX ORDER — PANTOPRAZOLE SODIUM 40 MG/10ML
INJECTION, POWDER, LYOPHILIZED, FOR SOLUTION INTRAVENOUS EVERY 24 HOURS
COMMUNITY
End: 2022-08-29

## 2022-08-29 RX ORDER — DULOXETIN HYDROCHLORIDE 20 MG/1
CAPSULE, DELAYED RELEASE ORAL EVERY 12 HOURS SCHEDULED
COMMUNITY
End: 2022-08-29

## 2022-08-29 RX ORDER — BUPROPION HYDROCHLORIDE 150 MG/1
150 TABLET ORAL EVERY MORNING
Qty: 30 TABLET | Refills: 1 | Status: SHIPPED | OUTPATIENT
Start: 2022-08-29 | End: 2022-09-26 | Stop reason: SDUPTHER

## 2022-08-29 NOTE — PROGRESS NOTES
"Subjective   Mando Hawk is a 27 y.o. male who presents today for initial evaluation   This provider is located at [address] [place] [address], [address] in [place], [place]. In-person visit consisting of the patient and I only. The patient is accepting of and agreeable to this appointment.       Chief Complaint:  Depression, anxiety    History of Present Illness: Depression/anxiety: Onset in 2014, patient stating, \" growing up and getting out of mom for the first time.  \" Depression and anxiety symptoms have increased over the past month, his brother passed away unexpectedly.  Over the past month has had low energy.  Trouble sleeping.  Appetite up and down.  Trouble focusing.  Endorses anhedonia.  Feelings of worthlessness and hopelessness at times.  Denies suicidal thoughts or homicidal thoughts.  Anxiety has been high, with excessive worries.  States that anxiety is pretty constant throughout the day.  Feels overwhelmed.  Difficulty manage anxiety.  Endorses physical symptoms of anxiety, including feeling tense, shaky and GI upset.    Psychiatric Review of Systems: Patient denies any current or previous hallucinations/delusions, paranoia, manic symptoms or PTSD.     PHQ-9 Depression Screening  PHQ-9 Total Score:      Little interest or pleasure in doing things?     Feeling down, depressed, or hopeless?     Trouble falling or staying asleep, or sleeping too much?     Feeling tired or having little energy?     Poor appetite or overeating?     Feeling bad about yourself - or that you are a failure or have let yourself or your family down?     Trouble concentrating on things, such as reading the newspaper or watching television?     Moving or speaking so slowly that other people could have noticed? Or the opposite - being so fidgety or restless that you have been moving around a lot more than usual?     Thoughts that you would be better off dead, or of hurting yourself in some way?     PHQ-9 Total Score   "     MARY-7  Feeling nervous, anxious or on edge: More than half the days  Not being able to stop or control worrying: Nearly every day  Worrying too much about different things: Nearly every day  Trouble Relaxing: Several days  Being so restless that it is hard to sit still: Several days  Feeling afraid as if something awful might happen: More than half the days  Becoming easily annoyed or irritable: More than half the days  MARY 7 Total Score: 14  If you checked any problems, how difficult have these problems made it for you to do your work, take care of things at home, or get along with other people: Extremely difficult      Past Surgical History:  Past Surgical History:   Procedure Laterality Date   • WISDOM TOOTH EXTRACTION         Problem List:  Patient Active Problem List   Diagnosis   • Depression   • Generalized anxiety disorder   • Heartburn   • Major depression, single episode   • Nonrheumatic tricuspid valve disorder       Allergy:   No Known Allergies     Discontinued Medications:  Medications Discontinued During This Encounter   Medication Reason   • DULoxetine (Cymbalta) 20 MG capsule *Therapy completed   • escitalopram (Lexapro) 10 MG tablet *Therapy completed   • pantoprazole (Protonix) 40 MG injection *Therapy completed       Current Medications:   Current Outpatient Medications   Medication Sig Dispense Refill   • famotidine (Pepcid) 40 MG tablet Take 1 tablet by mouth Daily. 30 tablet 5   • linaclotide (Linzess) 145 MCG capsule capsule Take 1 capsule by mouth Every Morning Before Breakfast. 30 capsule 1   • buPROPion XL (Wellbutrin XL) 150 MG 24 hr tablet Take 1 tablet by mouth Every Morning for 60 days. 30 tablet 1     No current facility-administered medications for this visit.       Past Medical History:  Past Medical History:   Diagnosis Date   • Anxiety    • Depression        Past Psychiatric History:  Began Treatment: 2014  Diagnoses: Depression, anxiety  Psychiatrist: Multiple previously in  "[facility], names unknown  Therapist: Multiple previously in [facility], names unknown  Admission History: Denies  Medication Trials: Lexapro, Cymbalta  Self Harm: Denies  Suicide Attempts: Denies    Substance Abuse History:   Types: Denies  Withdrawal Symptoms: Not applicable  Longest Period Sober: Not applicable  AA: Not applicable    Social History:  Martial Status: Single  Employed: [employer]  Kids: Denies  House: Parents   History: Denies    Social History     Socioeconomic History   • Marital status: Single   Tobacco Use   • Smoking status: Never Smoker   • Smokeless tobacco: Never Used   Vaping Use   • Vaping Use: Never used   Substance and Sexual Activity   • Alcohol use: Not Currently     Comment: soc.   • Drug use: Yes     Types: Marijuana   • Sexual activity: Not Currently       Family History:   Suicide Attempts: Denies  Suicide Completions: Denies      Family History   Problem Relation Age of Onset   • No Known Problems Mother    • No Known Problems Father        Developmental History:   Born: [place]  Siblings: 3 sisters  Childhood: Denies childhood abuse  High School: Graduate  College: Graduate    Access to Firearms: Denies    Mental Status Exam:     Appearance: good eye contact, normal street clothes, groomed, sitting in chair   Behavior: pleasant and cooperative  Motor: no abnormal  Speech: normal rhythm, rate, volume, tone, not hyperverbal, not pressured, normal prosidy  Mood: \" Okay\"  Affect: depressed  Thought Content: negative suicidal ideations, negative homicidal ideations, negative obsessions  Perceptions: negative auditory hallucinations, negative visual hallucinations, negative delusions, negative paranoia  Thought Process: goal directed, linear  Insight/Judgement: fair/fair  Cognition: grossly intact  Attention: intact  Orientation: person, place, time and situation  Memory: intact    Review of Systems:     Constitutional: Denies fatigue, night sweats  Eyes: Denies " "double vision, blurred vision  HENT: Denies vertigo, recent head injury  Cardiovascular: Denies chest pain, irregular heartbeats  Respiratory: Denies productive cough, shortness of breath  Gastrointestinal: Denies nausea, vomiting  Genitourinary: Denies dysuria, urinary retention  Integument: Denies hair growth change, new skin lesions  Neurologic: Denies altered mental status, seizures  Musculoskeletal: Denies joint swelling, limitation of motion  Endocrine: Denies cold intolerance, heat intolerance  Psychiatric: Admits anxiety, depression. Denies lorena, post-traumatic stress disorder, obsessive compulsive disorder, psychosis.   Allergic-immunologic: Denies frequent illnesses      Vital Signs:   /53   Ht 175.3 cm (69\")   Wt 101 kg (223 lb 9.6 oz)   BMI 33.02 kg/m²      Lab Results:   Office Visit on 08/15/2022   Component Date Value Ref Range Status   • WBC 08/15/2022 9.04  3.40 - 10.80 10*3/mm3 Final   • RBC 08/15/2022 5.35  4.14 - 5.80 10*6/mm3 Final   • Hemoglobin 08/15/2022 15.5  13.0 - 17.7 g/dL Final   • Hematocrit 08/15/2022 44.6  37.5 - 51.0 % Final   • MCV 08/15/2022 83.4  79.0 - 97.0 fL Final   • MCH 08/15/2022 29.0  26.6 - 33.0 pg Final   • MCHC 08/15/2022 34.8  31.5 - 35.7 g/dL Final   • RDW 08/15/2022 13.1  12.3 - 15.4 % Final   • RDW-SD 08/15/2022 39.6  37.0 - 54.0 fl Final   • MPV 08/15/2022 11.2  6.0 - 12.0 fL Final   • Platelets 08/15/2022 248  140 - 450 10*3/mm3 Final   • Neutrophil % 08/15/2022 54.9  42.7 - 76.0 % Final   • Lymphocyte % 08/15/2022 32.3  19.6 - 45.3 % Final   • Monocyte % 08/15/2022 10.5  5.0 - 12.0 % Final   • Eosinophil % 08/15/2022 1.7  0.3 - 6.2 % Final   • Basophil % 08/15/2022 0.2  0.0 - 1.5 % Final   • Immature Grans % 08/15/2022 0.4  0.0 - 0.5 % Final   • Neutrophils, Absolute 08/15/2022 4.96  1.70 - 7.00 10*3/mm3 Final   • Lymphocytes, Absolute 08/15/2022 2.92  0.70 - 3.10 10*3/mm3 Final   • Monocytes, Absolute 08/15/2022 0.95 (A) 0.10 - 0.90 10*3/mm3 Final   • " Eosinophils, Absolute 08/15/2022 0.15  0.00 - 0.40 10*3/mm3 Final   • Basophils, Absolute 08/15/2022 0.02  0.00 - 0.20 10*3/mm3 Final   • Immature Grans, Absolute 08/15/2022 0.04  0.00 - 0.05 10*3/mm3 Final   • nRBC 08/15/2022 0.0  0.0 - 0.2 /100 WBC Final   • Glucose 08/15/2022 83  65 - 99 mg/dL Final   • BUN 08/15/2022 14  6 - 20 mg/dL Final   • Creatinine 08/15/2022 0.85  0.76 - 1.27 mg/dL Final   • Sodium 08/15/2022 138  136 - 145 mmol/L Final   • Potassium 08/15/2022 4.1  3.5 - 5.2 mmol/L Final   • Chloride 08/15/2022 103  98 - 107 mmol/L Final   • CO2 08/15/2022 21.0 (A) 22.0 - 29.0 mmol/L Final   • Calcium 08/15/2022 9.7  8.6 - 10.5 mg/dL Final   • Total Protein 08/15/2022 7.0  6.0 - 8.5 g/dL Final   • Albumin 08/15/2022 4.80  3.50 - 5.20 g/dL Final   • ALT (SGPT) 08/15/2022 39  1 - 41 U/L Final   • AST (SGOT) 08/15/2022 19  1 - 40 U/L Final   • Alkaline Phosphatase 08/15/2022 55  39 - 117 U/L Final   • Total Bilirubin 08/15/2022 0.8  0.0 - 1.2 mg/dL Final   • Globulin 08/15/2022 2.2  gm/dL Final   • A/G Ratio 08/15/2022 2.2  g/dL Final   • BUN/Creatinine Ratio 08/15/2022 16.5  7.0 - 25.0 Final   • Anion Gap 08/15/2022 14.0  5.0 - 15.0 mmol/L Final   • eGFR 08/15/2022 121.4  >60.0 mL/min/1.73 Final    National Kidney Foundation and American Society of Nephrology (ASN) Task Force recommended calculation based on the Chronic Kidney Disease Epidemiology Collaboration (CKD-EPI) equation refit without adjustment for race.   Office Visit on 08/02/2022   Component Date Value Ref Range Status   • WBC 08/02/2022 11.00 (A) 3.40 - 10.80 10*3/mm3 Final   • RBC 08/02/2022 5.06  4.14 - 5.80 10*6/mm3 Final   • Hemoglobin 08/02/2022 14.7  13.0 - 17.7 g/dL Final   • Hematocrit 08/02/2022 43.4  37.5 - 51.0 % Final   • MCV 08/02/2022 85.8  79.0 - 97.0 fL Final   • MCH 08/02/2022 29.1  26.6 - 33.0 pg Final   • MCHC 08/02/2022 33.9  31.5 - 35.7 g/dL Final   • RDW 08/02/2022 13.1  12.3 - 15.4 % Final   • RDW-SD 08/02/2022 40.5   37.0 - 54.0 fl Final   • MPV 08/02/2022 11.2  6.0 - 12.0 fL Final   • Platelets 08/02/2022 250  140 - 450 10*3/mm3 Final   • Neutrophil % 08/02/2022 56.4  42.7 - 76.0 % Final   • Lymphocyte % 08/02/2022 31.1  19.6 - 45.3 % Final   • Monocyte % 08/02/2022 7.8  5.0 - 12.0 % Final   • Eosinophil % 08/02/2022 4.1  0.3 - 6.2 % Final   • Basophil % 08/02/2022 0.2  0.0 - 1.5 % Final   • Immature Grans % 08/02/2022 0.4  0.0 - 0.5 % Final   • Neutrophils, Absolute 08/02/2022 6.21  1.70 - 7.00 10*3/mm3 Final   • Lymphocytes, Absolute 08/02/2022 3.42 (A) 0.70 - 3.10 10*3/mm3 Final   • Monocytes, Absolute 08/02/2022 0.86  0.10 - 0.90 10*3/mm3 Final   • Eosinophils, Absolute 08/02/2022 0.45 (A) 0.00 - 0.40 10*3/mm3 Final   • Basophils, Absolute 08/02/2022 0.02  0.00 - 0.20 10*3/mm3 Final   • Immature Grans, Absolute 08/02/2022 0.04  0.00 - 0.05 10*3/mm3 Final   • nRBC 08/02/2022 0.0  0.0 - 0.2 /100 WBC Final   • Glucose 08/02/2022 85  65 - 99 mg/dL Final   • BUN 08/02/2022 15  6 - 20 mg/dL Final   • Creatinine 08/02/2022 0.81  0.76 - 1.27 mg/dL Final   • Sodium 08/02/2022 142  136 - 145 mmol/L Final   • Potassium 08/02/2022 4.5  3.5 - 5.2 mmol/L Final   • Chloride 08/02/2022 105  98 - 107 mmol/L Final   • CO2 08/02/2022 26.0  22.0 - 29.0 mmol/L Final   • Calcium 08/02/2022 9.4  8.6 - 10.5 mg/dL Final   • Total Protein 08/02/2022 6.7  6.0 - 8.5 g/dL Final   • Albumin 08/02/2022 4.60  3.50 - 5.20 g/dL Final   • ALT (SGPT) 08/02/2022 53 (A) 1 - 41 U/L Final   • AST (SGOT) 08/02/2022 20  1 - 40 U/L Final   • Alkaline Phosphatase 08/02/2022 52  39 - 117 U/L Final   • Total Bilirubin 08/02/2022 0.3  0.0 - 1.2 mg/dL Final   • Globulin 08/02/2022 2.1  gm/dL Final   • A/G Ratio 08/02/2022 2.2  g/dL Final   • BUN/Creatinine Ratio 08/02/2022 18.5  7.0 - 25.0 Final   • Anion Gap 08/02/2022 11.0  5.0 - 15.0 mmol/L Final   • eGFR 08/02/2022 123.2  >60.0 mL/min/1.73 Final    National Kidney Foundation and American Society of Nephrology (ASN)  Task Force recommended calculation based on the Chronic Kidney Disease Epidemiology Collaboration (CKD-EPI) equation refit without adjustment for race.   • TSH 08/02/2022 1.600  0.270 - 4.200 uIU/mL Final   • Free T4 08/02/2022 1.27  0.93 - 1.70 ng/dL Final    T4 results may be falsely increased if patient taking Biotin.   • Folate 08/02/2022 14.40  4.78 - 24.20 ng/mL Final   • Vitamin B-12 08/02/2022 309  211 - 946 pg/mL Final   Admission on 05/09/2022, Discharged on 05/09/2022   Component Date Value Ref Range Status   • Rapid Strep A Screen 05/09/2022 Negative  Negative, VALID, INVALID, Not Performed Final   • Internal Control 05/09/2022 Passed  Passed Final   • Lot Number 05/09/2022 [id number]   Final   • Expiration Date 05/09/2022 10/31/23   Final   Admission on 03/29/2022, Discharged on 03/29/2022   Component Date Value Ref Range Status   • Color, UA 03/29/2022 Yellow  Yellow, Straw Final   • Appearance, UA 03/29/2022 Cloudy (A) Clear Final   • pH, UA 03/29/2022 7.5  5.0 - 8.0 Final   • Specific Gravity, UA 03/29/2022 1.021  1.005 - 1.030 Final   • Glucose, UA 03/29/2022 Negative  Negative Final   • Ketones, UA 03/29/2022 Negative  Negative Final   • Bilirubin, UA 03/29/2022 Negative  Negative Final   • Blood, UA 03/29/2022 Negative  Negative Final   • Protein, UA 03/29/2022 Negative  Negative Final   • Leuk Esterase, UA 03/29/2022 Negative  Negative Final   • Nitrite, UA 03/29/2022 Negative  Negative Final   • Urobilinogen, UA 03/29/2022 1.0 E.U./dL  0.2 - 1.0 E.U./dL Final   • Glucose 03/29/2022 110 (A) 65 - 99 mg/dL Final   • BUN 03/29/2022 19  6 - 20 mg/dL Final   • Creatinine 03/29/2022 0.93  0.76 - 1.27 mg/dL Final   • Sodium 03/29/2022 139  136 - 145 mmol/L Final   • Potassium 03/29/2022 3.6  3.5 - 5.2 mmol/L Final   • Chloride 03/29/2022 101  98 - 107 mmol/L Final   • CO2 03/29/2022 25.8  22.0 - 29.0 mmol/L Final   • Calcium 03/29/2022 9.6  8.6 - 10.5 mg/dL Final   • Total Protein 03/29/2022 7.0  6.0 -  8.5 g/dL Final   • Albumin 03/29/2022 4.50  3.50 - 5.20 g/dL Final   • ALT (SGPT) 03/29/2022 65 (A) 1 - 41 U/L Final   • AST (SGOT) 03/29/2022 24  1 - 40 U/L Final   • Alkaline Phosphatase 03/29/2022 61  39 - 117 U/L Final   • Total Bilirubin 03/29/2022 0.3  0.0 - 1.2 mg/dL Final   • Globulin 03/29/2022 2.5  gm/dL Final   • A/G Ratio 03/29/2022 1.8  g/dL Final   • BUN/Creatinine Ratio 03/29/2022 20.4  7.0 - 25.0 Final   • Anion Gap 03/29/2022 12.2  5.0 - 15.0 mmol/L Final   • eGFR 03/29/2022 114.7  >60.0 mL/min/1.73 Final    National Kidney Foundation and American Society of Nephrology (ASN) Task Force recommended calculation based on the Chronic Kidney Disease Epidemiology Collaboration (CKD-EPI) equation refit without adjustment for race.   • WBC 03/29/2022 11.96 (A) 3.40 - 10.80 10*3/mm3 Final   • RBC 03/29/2022 5.26  4.14 - 5.80 10*6/mm3 Final   • Hemoglobin 03/29/2022 15.5  13.0 - 17.7 g/dL Final   • Hematocrit 03/29/2022 44.9  37.5 - 51.0 % Final   • MCV 03/29/2022 85.4  79.0 - 97.0 fL Final   • MCH 03/29/2022 29.5  26.6 - 33.0 pg Final   • MCHC 03/29/2022 34.5  31.5 - 35.7 g/dL Final   • RDW 03/29/2022 13.2  12.3 - 15.4 % Final   • RDW-SD 03/29/2022 41.1  37.0 - 54.0 fl Final   • MPV 03/29/2022 10.5  6.0 - 12.0 fL Final   • Platelets 03/29/2022 243  140 - 450 10*3/mm3 Final   • Neutrophil % 03/29/2022 52.5  42.7 - 76.0 % Final   • Lymphocyte % 03/29/2022 34.7  19.6 - 45.3 % Final   • Monocyte % 03/29/2022 9.4  5.0 - 12.0 % Final   • Eosinophil % 03/29/2022 3.0  0.3 - 6.2 % Final   • Basophil % 03/29/2022 0.1  0.0 - 1.5 % Final   • Immature Grans % 03/29/2022 0.3  0.0 - 0.5 % Final   • Neutrophils, Absolute 03/29/2022 6.27  1.70 - 7.00 10*3/mm3 Final   • Lymphocytes, Absolute 03/29/2022 4.15 (A) 0.70 - 3.10 10*3/mm3 Final   • Monocytes, Absolute 03/29/2022 1.13 (A) 0.10 - 0.90 10*3/mm3 Final   • Eosinophils, Absolute 03/29/2022 0.36  0.00 - 0.40 10*3/mm3 Final   • Basophils, Absolute 03/29/2022 0.01  0.00 -  0.20 10*3/mm3 Final   • Immature Grans, Absolute 03/29/2022 0.04  0.00 - 0.05 10*3/mm3 Final   • nRBC 03/29/2022 0.0  0.0 - 0.2 /100 WBC Final       EKG Results:  No orders to display       Imaging Results:  No Images in the past 120 days found..      Assessment & Plan   Diagnoses and all orders for this visit:    1. Major depressive disorder, recurrent episode, moderate (HCC) (Primary)  -     buPROPion XL (Wellbutrin XL) 150 MG 24 hr tablet; Take 1 tablet by mouth Every Morning for 60 days.  Dispense: 30 tablet; Refill: 1    2. Generalized anxiety disorder    3. Insomnia due to mental disorder    4. Grief      Patient presents with history of depression and anxiety.  We will start on bupropion to target depression and anxiety. 20 minutes of supportive psychotherapy with goal to strengthen defenses, promote problems solving, restore adaptive functioning and provide symptom relief. The therapeutic alliance was strengthened to encourage the patient to express their thoughts and feelings. Esteem building was enhanced through praise, reassurance, normalizing and encouragement. Coping skills were enhanced to build distress tolerance skills and emotional regulation. Patient given education on medication side effects, diagnosis/illness and relapse symptoms. Plan to continue supportive psychotherapy in next appointment to provide symptom relief. 4 weeks    Visit Diagnoses:    ICD-10-CM ICD-9-CM   1. Major depressive disorder, recurrent episode, moderate (HCC)  F33.1 296.32   2. Generalized anxiety disorder  F41.1 300.02   3. Insomnia due to mental disorder  F51.05 300.9     327.02   4. Grief  F43.21 309.0       PLAN:  1. Safety: No acute safety concerns.   2. Therapy: Declines  3. Risk Assessment: Risk of self-harm acutely is moderate.  Risk factors include anxiety disorder, mood disorder, and recent psychosocial stressors (pandemic). Protective factors include no family history, denies access to guns/weapons, no present  SI, no history of suicide attempts or self-harm in the past, minimal AODA, healthcare seeking, future orientation, willingness to engage in care.  Risk of self-harm chronically is also moderate, but could be further elevated in the event of treatment noncompliance and/or AODA.  4. Medications: Start bupropion  mg p.o. daily to target depression. Risks, benefits, alternatives discussed with patient including nausea, GI upset, increased energy, exacerbation of irritability, insomnia, lowering of seizure threshold.  After discussion of these risks and benefits, the patient voiced understanding and agreed to proceed.  5. Labs/studies: No labs/studies ordered at this time  6. Follow-up: 1 month    Patient screened positive for depression based on a PHQ-9 score of 18 on 8/29/2022. Follow-up recommendations include: Suicide Risk Assessment performed.         TREATMENT PLAN/GOALS: Continue supportive psychotherapy efforts and medications as indicated. Treatment and medication options discussed during today's visit. Patient ackowledged and verbally consented to continue with current treatment plan and was educated on the importance of compliance with treatment and follow-up appointments.      MEDICATION ISSUES:  NURY reviewed as expected.  Discussed medication options and treatment plan of prescribed medication as well as the risks, benefits, and side effects including potential falls, possible impaired driving and metabolic adversities among others. Patient is agreeable to call the office with any worsening of symptoms or onset of side effects. Patient is agreeable to call 911 or go to the nearest ER should he/she begin having SI/HI. No medication side effects or related complaints today.     MEDS ORDERED DURING VISIT:  New Medications Ordered This Visit   Medications   • buPROPion XL (Wellbutrin XL) 150 MG 24 hr tablet     Sig: Take 1 tablet by mouth Every Morning for 60 days.     Dispense:  30 tablet     Refill:   1       Return in about 4 weeks (around 9/26/2022) for Next scheduled follow up.         This document has been electronically signed by SHANNA Arora  August 29, 2022 09:14 EDT      Part of this note may be an electronic transcription/translation of spoken language to printed text using the Dragon Dictation System.

## 2022-09-26 ENCOUNTER — OFFICE VISIT (OUTPATIENT)
Dept: PSYCHIATRY | Facility: CLINIC | Age: 27
End: 2022-09-26

## 2022-09-26 VITALS
WEIGHT: 221.6 LBS | BODY MASS INDEX: 32.82 KG/M2 | HEIGHT: 69 IN | DIASTOLIC BLOOD PRESSURE: 75 MMHG | SYSTOLIC BLOOD PRESSURE: 131 MMHG

## 2022-09-26 DIAGNOSIS — F33.1 MAJOR DEPRESSIVE DISORDER, RECURRENT EPISODE, MODERATE: Primary | ICD-10-CM

## 2022-09-26 DIAGNOSIS — F51.05 INSOMNIA DUE TO MENTAL DISORDER: ICD-10-CM

## 2022-09-26 DIAGNOSIS — F41.1 GENERALIZED ANXIETY DISORDER: ICD-10-CM

## 2022-09-26 PROCEDURE — 90833 PSYTX W PT W E/M 30 MIN: CPT | Performed by: NURSE PRACTITIONER

## 2022-09-26 PROCEDURE — 99214 OFFICE O/P EST MOD 30 MIN: CPT | Performed by: NURSE PRACTITIONER

## 2022-09-26 RX ORDER — BUPROPION HYDROCHLORIDE 300 MG/1
300 TABLET ORAL EVERY MORNING
Qty: 30 TABLET | Refills: 1 | Status: SHIPPED | OUTPATIENT
Start: 2022-09-26 | End: 2022-11-25

## 2022-09-26 NOTE — PROGRESS NOTES
Subjective   Mando Hawk is a 27 y.o. male who presents today for follow up.   This provider is located at [address] [place] [address], [address] in [place], [place]. In-person visit consisting of the patient and I only. The patient is accepting of and agreeable to this appointment.     Chief Complaint:  Depression, anxiety    History of Present Illness:     Depression over the past month- has had increase in stress   Sleep- good  Interest- Denies anhedonia  Guilt- Denies  Energy- low at times  Concentration- denies  Appetite- good  Psychomotor retardation/agitation- Denies  Suicidal thoughts or hopelessness- denies hopelessness, si or hi.     Anxiety over the past month- has been high   Anxious, nervous or worried on most days about a number of events or activities- excessive worries  No control over worries- difficult to manage at times- working on positive coping skills  Irritability- denies  Concentration- see above  Sleep- see above  Restlessness- denies  Tension in muscles- endorses    Psychiatric Review of Systems: Patient denies any current or previous hallucinations/delusions, paranoia, manic symptoms or PTSD.     PHQ-9 Depression Screening  PHQ-9 Total Score:      Little interest or pleasure in doing things?     Feeling down, depressed, or hopeless?     Trouble falling or staying asleep, or sleeping too much?     Feeling tired or having little energy?     Poor appetite or overeating?     Feeling bad about yourself - or that you are a failure or have let yourself or your family down?     Trouble concentrating on things, such as reading the newspaper or watching television?     Moving or speaking so slowly that other people could have noticed? Or the opposite - being so fidgety or restless that you have been moving around a lot more than usual?     Thoughts that you would be better off dead, or of hurting yourself in some way?     PHQ-9 Total Score       MARY-7  Feeling nervous, anxious or on edge:  Nearly every day  Not being able to stop or control worrying: Nearly every day  Worrying too much about different things: More than half the days  Trouble Relaxing: More than half the days  Being so restless that it is hard to sit still: Several days  Feeling afraid as if something awful might happen: More than half the days  Becoming easily annoyed or irritable: Several days  MARY 7 Total Score: 14  If you checked any problems, how difficult have these problems made it for you to do your work, take care of things at home, or get along with other people: Somewhat difficult      Past Surgical History:  Past Surgical History:   Procedure Laterality Date   • WISDOM TOOTH EXTRACTION         Problem List:  Patient Active Problem List   Diagnosis   • Depression   • Generalized anxiety disorder   • Heartburn   • Major depression, single episode   • Nonrheumatic tricuspid valve disorder       Allergy:   No Known Allergies     Discontinued Medications:  Medications Discontinued During This Encounter   Medication Reason   • buPROPion XL (Wellbutrin XL) 150 MG 24 hr tablet Reorder       Current Medications:   Current Outpatient Medications   Medication Sig Dispense Refill   • buPROPion XL (Wellbutrin XL) 300 MG 24 hr tablet Take 1 tablet by mouth Every Morning for 60 days. 30 tablet 1   • famotidine (Pepcid) 40 MG tablet Take 1 tablet by mouth Daily. 30 tablet 5   • linaclotide (Linzess) 145 MCG capsule capsule Take 1 capsule by mouth Every Morning Before Breakfast. 30 capsule 1     No current facility-administered medications for this visit.       Past Medical History:  Past Medical History:   Diagnosis Date   • Anxiety    • Depression        Past Psychiatric History:  Began Treatment: 2014  Diagnoses: Depression, anxiety  Psychiatrist: Multiple previously in [facility], names unknown  Therapist: Multiple previously in [facility], names unknown  Admission History: Denies  Medication Trials: Lexapro,  "Cymbalta  Self Harm: Denies  Suicide Attempts: Denies    Substance Abuse History:   Types: Denies  Withdrawal Symptoms: Not applicable  Longest Period Sober: Not applicable  AA: Not applicable    Social History:  Martial Status: Single  Employed: [employer]  Kids: Denies  House: Parents   History: Denies    Social History     Socioeconomic History   • Marital status: Single   Tobacco Use   • Smoking status: Never Smoker   • Smokeless tobacco: Never Used   Vaping Use   • Vaping Use: Never used   Substance and Sexual Activity   • Alcohol use: Not Currently     Comment: soc.   • Drug use: Yes     Types: Marijuana   • Sexual activity: Not Currently       Family History:   Suicide Attempts: Denies  Suicide Completions: Denies      Family History   Problem Relation Age of Onset   • No Known Problems Mother    • No Known Problems Father        Developmental History:   [facility]  Siblings: 3 sisters  Childhood: Denies childhood abuse  High School: Graduate  College: Graduate    Access to Firearms: Denies    Mental Status Exam:     Appearance: good eye contact, normal street clothes, groomed, sitting in chair   Behavior: pleasant and cooperative  Motor: no abnormal  Speech: normal rhythm, rate, volume, tone, not hyperverbal, not pressured, normal prosidy  Mood: \"Alright\"  Affect: euthymic  Thought Content: negative suicidal ideations, negative homicidal ideations, negative obsessions  Perceptions: negative auditory hallucinations, negative visual hallucinations, negative delusions, negative paranoia  Thought Process: goal directed, linear  Insight/Judgement: fair/fair  Cognition: grossly intact  Attention: intact  Orientation: person, place, time and situation  Memory: intact    Review of Systems:     Constitutional: Denies fatigue, night sweats  Eyes: Denies double vision, blurred vision  HENT: Denies vertigo, recent head injury  Cardiovascular: Denies chest pain, irregular heartbeats  Respiratory: Denies productive " "cough, shortness of breath  Gastrointestinal: Denies nausea, vomiting  Genitourinary: Denies dysuria, urinary retention  Integument: Denies hair growth change, new skin lesions  Neurologic: Denies altered mental status, seizures  Musculoskeletal: Denies joint swelling, limitation of motion  Endocrine: Denies cold intolerance, heat intolerance  Psychiatric: Admits anxiety, depression. Denies lorena, post-traumatic stress disorder, obsessive compulsive disorder, psychosis.   Allergic-immunologic: Denies frequent illnesses      Vital Signs:   /75   Ht 175.3 cm (69\")   Wt 101 kg (221 lb 9.6 oz)   BMI 32.72 kg/m²      Lab Results:   Office Visit on 08/15/2022   Component Date Value Ref Range Status   • WBC 08/15/2022 9.04  3.40 - 10.80 10*3/mm3 Final   • RBC 08/15/2022 5.35  4.14 - 5.80 10*6/mm3 Final   • Hemoglobin 08/15/2022 15.5  13.0 - 17.7 g/dL Final   • Hematocrit 08/15/2022 44.6  37.5 - 51.0 % Final   • MCV 08/15/2022 83.4  79.0 - 97.0 fL Final   • MCH 08/15/2022 29.0  26.6 - 33.0 pg Final   • MCHC 08/15/2022 34.8  31.5 - 35.7 g/dL Final   • RDW 08/15/2022 13.1  12.3 - 15.4 % Final   • RDW-SD 08/15/2022 39.6  37.0 - 54.0 fl Final   • MPV 08/15/2022 11.2  6.0 - 12.0 fL Final   • Platelets 08/15/2022 248  140 - 450 10*3/mm3 Final   • Neutrophil % 08/15/2022 54.9  42.7 - 76.0 % Final   • Lymphocyte % 08/15/2022 32.3  19.6 - 45.3 % Final   • Monocyte % 08/15/2022 10.5  5.0 - 12.0 % Final   • Eosinophil % 08/15/2022 1.7  0.3 - 6.2 % Final   • Basophil % 08/15/2022 0.2  0.0 - 1.5 % Final   • Immature Grans % 08/15/2022 0.4  0.0 - 0.5 % Final   • Neutrophils, Absolute 08/15/2022 4.96  1.70 - 7.00 10*3/mm3 Final   • Lymphocytes, Absolute 08/15/2022 2.92  0.70 - 3.10 10*3/mm3 Final   • Monocytes, Absolute 08/15/2022 0.95 (A) 0.10 - 0.90 10*3/mm3 Final   • Eosinophils, Absolute 08/15/2022 0.15  0.00 - 0.40 10*3/mm3 Final   • Basophils, Absolute 08/15/2022 0.02  0.00 - 0.20 10*3/mm3 Final   • Immature Grans, Absolute " 08/15/2022 0.04  0.00 - 0.05 10*3/mm3 Final   • nRBC 08/15/2022 0.0  0.0 - 0.2 /100 WBC Final   • Glucose 08/15/2022 83  65 - 99 mg/dL Final   • BUN 08/15/2022 14  6 - 20 mg/dL Final   • Creatinine 08/15/2022 0.85  0.76 - 1.27 mg/dL Final   • Sodium 08/15/2022 138  136 - 145 mmol/L Final   • Potassium 08/15/2022 4.1  3.5 - 5.2 mmol/L Final   • Chloride 08/15/2022 103  98 - 107 mmol/L Final   • CO2 08/15/2022 21.0 (A) 22.0 - 29.0 mmol/L Final   • Calcium 08/15/2022 9.7  8.6 - 10.5 mg/dL Final   • Total Protein 08/15/2022 7.0  6.0 - 8.5 g/dL Final   • Albumin 08/15/2022 4.80  3.50 - 5.20 g/dL Final   • ALT (SGPT) 08/15/2022 39  1 - 41 U/L Final   • AST (SGOT) 08/15/2022 19  1 - 40 U/L Final   • Alkaline Phosphatase 08/15/2022 55  39 - 117 U/L Final   • Total Bilirubin 08/15/2022 0.8  0.0 - 1.2 mg/dL Final   • Globulin 08/15/2022 2.2  gm/dL Final   • A/G Ratio 08/15/2022 2.2  g/dL Final   • BUN/Creatinine Ratio 08/15/2022 16.5  7.0 - 25.0 Final   • Anion Gap 08/15/2022 14.0  5.0 - 15.0 mmol/L Final   • eGFR 08/15/2022 121.4  >60.0 mL/min/1.73 Final    National Kidney Foundation and American Society of Nephrology (ASN) Task Force recommended calculation based on the Chronic Kidney Disease Epidemiology Collaboration (CKD-EPI) equation refit without adjustment for race.   Office Visit on 08/02/2022   Component Date Value Ref Range Status   • WBC 08/02/2022 11.00 (A) 3.40 - 10.80 10*3/mm3 Final   • RBC 08/02/2022 5.06  4.14 - 5.80 10*6/mm3 Final   • Hemoglobin 08/02/2022 14.7  13.0 - 17.7 g/dL Final   • Hematocrit 08/02/2022 43.4  37.5 - 51.0 % Final   • MCV 08/02/2022 85.8  79.0 - 97.0 fL Final   • MCH 08/02/2022 29.1  26.6 - 33.0 pg Final   • MCHC 08/02/2022 33.9  31.5 - 35.7 g/dL Final   • RDW 08/02/2022 13.1  12.3 - 15.4 % Final   • RDW-SD 08/02/2022 40.5  37.0 - 54.0 fl Final   • MPV 08/02/2022 11.2  6.0 - 12.0 fL Final   • Platelets 08/02/2022 250  140 - 450 10*3/mm3 Final   • Neutrophil % 08/02/2022 56.4  42.7 -  76.0 % Final   • Lymphocyte % 08/02/2022 31.1  19.6 - 45.3 % Final   • Monocyte % 08/02/2022 7.8  5.0 - 12.0 % Final   • Eosinophil % 08/02/2022 4.1  0.3 - 6.2 % Final   • Basophil % 08/02/2022 0.2  0.0 - 1.5 % Final   • Immature Grans % 08/02/2022 0.4  0.0 - 0.5 % Final   • Neutrophils, Absolute 08/02/2022 6.21  1.70 - 7.00 10*3/mm3 Final   • Lymphocytes, Absolute 08/02/2022 3.42 (A) 0.70 - 3.10 10*3/mm3 Final   • Monocytes, Absolute 08/02/2022 0.86  0.10 - 0.90 10*3/mm3 Final   • Eosinophils, Absolute 08/02/2022 0.45 (A) 0.00 - 0.40 10*3/mm3 Final   • Basophils, Absolute 08/02/2022 0.02  0.00 - 0.20 10*3/mm3 Final   • Immature Grans, Absolute 08/02/2022 0.04  0.00 - 0.05 10*3/mm3 Final   • nRBC 08/02/2022 0.0  0.0 - 0.2 /100 WBC Final   • Glucose 08/02/2022 85  65 - 99 mg/dL Final   • BUN 08/02/2022 15  6 - 20 mg/dL Final   • Creatinine 08/02/2022 0.81  0.76 - 1.27 mg/dL Final   • Sodium 08/02/2022 142  136 - 145 mmol/L Final   • Potassium 08/02/2022 4.5  3.5 - 5.2 mmol/L Final   • Chloride 08/02/2022 105  98 - 107 mmol/L Final   • CO2 08/02/2022 26.0  22.0 - 29.0 mmol/L Final   • Calcium 08/02/2022 9.4  8.6 - 10.5 mg/dL Final   • Total Protein 08/02/2022 6.7  6.0 - 8.5 g/dL Final   • Albumin 08/02/2022 4.60  3.50 - 5.20 g/dL Final   • ALT (SGPT) 08/02/2022 53 (A) 1 - 41 U/L Final   • AST (SGOT) 08/02/2022 20  1 - 40 U/L Final   • Alkaline Phosphatase 08/02/2022 52  39 - 117 U/L Final   • Total Bilirubin 08/02/2022 0.3  0.0 - 1.2 mg/dL Final   • Globulin 08/02/2022 2.1  gm/dL Final   • A/G Ratio 08/02/2022 2.2  g/dL Final   • BUN/Creatinine Ratio 08/02/2022 18.5  7.0 - 25.0 Final   • Anion Gap 08/02/2022 11.0  5.0 - 15.0 mmol/L Final   • eGFR 08/02/2022 123.2  >60.0 mL/min/1.73 Final    National Kidney Foundation and American Society of Nephrology (ASN) Task Force recommended calculation based on the Chronic Kidney Disease Epidemiology Collaboration (CKD-EPI) equation refit without adjustment for race.   • TSH  08/02/2022 1.600  0.270 - 4.200 uIU/mL Final   • Free T4 08/02/2022 1.27  0.93 - 1.70 ng/dL Final    T4 results may be falsely increased if patient taking Biotin.   • Folate 08/02/2022 14.40  4.78 - 24.20 ng/mL Final   • Vitamin B-12 08/02/2022 309  211 - 946 pg/mL Final   Admission on 05/09/2022, Discharged on 05/09/2022   Component Date Value Ref Range Status   • Rapid Strep A Screen 05/09/2022 Negative  Negative, VALID, INVALID, Not Performed Final   • Internal Control 05/09/2022 Passed  Passed Final   • Lot Number 05/09/2022 [id number]   Final   • Expiration Date 05/09/2022 10/31/23   Final   Admission on 03/29/2022, Discharged on 03/29/2022   Component Date Value Ref Range Status   • Color, UA 03/29/2022 Yellow  Yellow, Straw Final   • Appearance, UA 03/29/2022 Cloudy (A) Clear Final   • pH, UA 03/29/2022 7.5  5.0 - 8.0 Final   • Specific Gravity, UA 03/29/2022 1.021  1.005 - 1.030 Final   • Glucose, UA 03/29/2022 Negative  Negative Final   • Ketones, UA 03/29/2022 Negative  Negative Final   • Bilirubin, UA 03/29/2022 Negative  Negative Final   • Blood, UA 03/29/2022 Negative  Negative Final   • Protein, UA 03/29/2022 Negative  Negative Final   • Leuk Esterase, UA 03/29/2022 Negative  Negative Final   • Nitrite, UA 03/29/2022 Negative  Negative Final   • Urobilinogen, UA 03/29/2022 1.0 E.U./dL  0.2 - 1.0 E.U./dL Final   • Glucose 03/29/2022 110 (A) 65 - 99 mg/dL Final   • BUN 03/29/2022 19  6 - 20 mg/dL Final   • Creatinine 03/29/2022 0.93  0.76 - 1.27 mg/dL Final   • Sodium 03/29/2022 139  136 - 145 mmol/L Final   • Potassium 03/29/2022 3.6  3.5 - 5.2 mmol/L Final   • Chloride 03/29/2022 101  98 - 107 mmol/L Final   • CO2 03/29/2022 25.8  22.0 - 29.0 mmol/L Final   • Calcium 03/29/2022 9.6  8.6 - 10.5 mg/dL Final   • Total Protein 03/29/2022 7.0  6.0 - 8.5 g/dL Final   • Albumin 03/29/2022 4.50  3.50 - 5.20 g/dL Final   • ALT (SGPT) 03/29/2022 65 (A) 1 - 41 U/L Final   • AST (SGOT) 03/29/2022 24  1 - 40 U/L  Final   • Alkaline Phosphatase 03/29/2022 61  39 - 117 U/L Final   • Total Bilirubin 03/29/2022 0.3  0.0 - 1.2 mg/dL Final   • Globulin 03/29/2022 2.5  gm/dL Final   • A/G Ratio 03/29/2022 1.8  g/dL Final   • BUN/Creatinine Ratio 03/29/2022 20.4  7.0 - 25.0 Final   • Anion Gap 03/29/2022 12.2  5.0 - 15.0 mmol/L Final   • eGFR 03/29/2022 114.7  >60.0 mL/min/1.73 Final    National Kidney Foundation and American Society of Nephrology (ASN) Task Force recommended calculation based on the Chronic Kidney Disease Epidemiology Collaboration (CKD-EPI) equation refit without adjustment for race.   • WBC 03/29/2022 11.96 (A) 3.40 - 10.80 10*3/mm3 Final   • RBC 03/29/2022 5.26  4.14 - 5.80 10*6/mm3 Final   • Hemoglobin 03/29/2022 15.5  13.0 - 17.7 g/dL Final   • Hematocrit 03/29/2022 44.9  37.5 - 51.0 % Final   • MCV 03/29/2022 85.4  79.0 - 97.0 fL Final   • MCH 03/29/2022 29.5  26.6 - 33.0 pg Final   • MCHC 03/29/2022 34.5  31.5 - 35.7 g/dL Final   • RDW 03/29/2022 13.2  12.3 - 15.4 % Final   • RDW-SD 03/29/2022 41.1  37.0 - 54.0 fl Final   • MPV 03/29/2022 10.5  6.0 - 12.0 fL Final   • Platelets 03/29/2022 243  140 - 450 10*3/mm3 Final   • Neutrophil % 03/29/2022 52.5  42.7 - 76.0 % Final   • Lymphocyte % 03/29/2022 34.7  19.6 - 45.3 % Final   • Monocyte % 03/29/2022 9.4  5.0 - 12.0 % Final   • Eosinophil % 03/29/2022 3.0  0.3 - 6.2 % Final   • Basophil % 03/29/2022 0.1  0.0 - 1.5 % Final   • Immature Grans % 03/29/2022 0.3  0.0 - 0.5 % Final   • Neutrophils, Absolute 03/29/2022 6.27  1.70 - 7.00 10*3/mm3 Final   • Lymphocytes, Absolute 03/29/2022 4.15 (A) 0.70 - 3.10 10*3/mm3 Final   • Monocytes, Absolute 03/29/2022 1.13 (A) 0.10 - 0.90 10*3/mm3 Final   • Eosinophils, Absolute 03/29/2022 0.36  0.00 - 0.40 10*3/mm3 Final   • Basophils, Absolute 03/29/2022 0.01  0.00 - 0.20 10*3/mm3 Final   • Immature Grans, Absolute 03/29/2022 0.04  0.00 - 0.05 10*3/mm3 Final   • nRBC 03/29/2022 0.0  0.0 - 0.2 /100 WBC Final       EKG  Results:  No orders to display       Imaging Results:  No Images in the past 120 days found..      Assessment & Plan   Diagnoses and all orders for this visit:    1. Major depressive disorder, recurrent episode, moderate (HCC) (Primary)  -     buPROPion XL (Wellbutrin XL) 300 MG 24 hr tablet; Take 1 tablet by mouth Every Morning for 60 days.  Dispense: 30 tablet; Refill: 1    2. Generalized anxiety disorder    3. Insomnia due to mental disorder      Increase bupropion to target depression and anxiety. Sleep hygiene education to target insomnia. 17 minutes of supportive psychotherapy with goal to strengthen defenses, promote problems solving, restore adaptive functioning and provide symptom relief. The therapeutic alliance was strengthened to encourage the patient to express their thoughts and feelings. Esteem building was enhanced through praise, reassurance, normalizing and encouragement. Coping skills were enhanced to build distress tolerance skills and emotional regulation. Patient given education on medication side effects, diagnosis/illness and relapse symptoms. Plan to continue supportive psychotherapy in next appointment to provide symptom relief. 4 weeks    Visit Diagnoses:    ICD-10-CM ICD-9-CM   1. Major depressive disorder, recurrent episode, moderate (HCC)  F33.1 296.32   2. Generalized anxiety disorder  F41.1 300.02   3. Insomnia due to mental disorder  F51.05 300.9     327.02       PLAN:  1. Safety: No acute safety concerns.   2. Therapy: Declines  3. Risk Assessment: Risk of self-harm acutely is moderate.  Risk factors include anxiety disorder, mood disorder, and recent psychosocial stressors (pandemic). Protective factors include no family history, denies access to guns/weapons, no present SI, no history of suicide attempts or self-harm in the past, minimal AODA, healthcare seeking, future orientation, willingness to engage in care.  Risk of self-harm chronically is also moderate, but could be  further elevated in the event of treatment noncompliance and/or AODA.  4. Medications: Increase bupropion  mg to 300mg p.o. daily to target depression. Risks, benefits, alternatives discussed with patient including nausea, GI upset, increased energy, exacerbation of irritability, insomnia, lowering of seizure threshold.  After discussion of these risks and benefits, the patient voiced understanding and agreed to proceed.  5. Labs/studies: No labs/studies ordered at this time  6. Follow-up: 1 month    Patient screened positive for depression based on a PHQ-9 score of 19 on 9/26/2022. Follow-up recommendations include: Suicide Risk Assessment performed.         TREATMENT PLAN/GOALS: Continue supportive psychotherapy efforts and medications as indicated. Treatment and medication options discussed during today's visit. Patient ackowledged and verbally consented to continue with current treatment plan and was educated on the importance of compliance with treatment and follow-up appointments.      MEDICATION ISSUES:  NURY reviewed as expected.  Discussed medication options and treatment plan of prescribed medication as well as the risks, benefits, and side effects including potential falls, possible impaired driving and metabolic adversities among others. Patient is agreeable to call the office with any worsening of symptoms or onset of side effects. Patient is agreeable to call 911 or go to the nearest ER should he/she begin having SI/HI. No medication side effects or related complaints today.     MEDS ORDERED DURING VISIT:  New Medications Ordered This Visit   Medications   • buPROPion XL (Wellbutrin XL) 300 MG 24 hr tablet     Sig: Take 1 tablet by mouth Every Morning for 60 days.     Dispense:  30 tablet     Refill:  1       Return in about 4 weeks (around 10/24/2022) for Next scheduled follow up.         This document has been electronically signed by SHANNA Arora  September 26, 2022 08:52 EDT      Part  of this note may be an electronic transcription/translation of spoken language to printed text using the Dragon Dictation System.

## 2022-09-26 NOTE — TREATMENT PLAN
Multi-Disciplinary Problems (from Behavioral Health Treatment Plan)    Active Problems     Problem: Depression  Start Date: 08/29/22    Problem Details: The patient self-scales this problem as a 5 with 10 being the worst.        Goal Priority Start Date Expected End Date End Date    Patient will demonstrate the ability to initiate new constructive life skills outside of sessions on a consistent basis. -- 08/29/22 -- --    Goal Details: Progress toward goal:  5/10        Goal Intervention Frequency Start Date End Date    Assist patient in setting attainable activities of daily living goals. PRN 08/29/22 --    Goal Intervention Frequency Start Date End Date    Provide education about depression Weekly 08/29/22 --    Intervention Details: Duration of treatment until until remission of symptoms.        Goal Intervention Frequency Start Date End Date    Assist patient in developing healthy coping strategies. Weekly 08/29/22 --    Intervention Details: Duration of treatment until until remission of symptoms.                    Reviewed By     Nigel Galicia APRN 09/26/22 0809                 I have discussed and reviewed this treatment plan with the patient.

## 2022-10-05 ENCOUNTER — TELEPHONE (OUTPATIENT)
Dept: FAMILY MEDICINE CLINIC | Facility: CLINIC | Age: 27
End: 2022-10-05

## 2022-10-05 NOTE — TELEPHONE ENCOUNTER
Called pt no VM setup.  Referral dept attempted to contact pt several times to schedule US. No response. CX US  order and stool order

## 2023-08-09 ENCOUNTER — OFFICE VISIT (OUTPATIENT)
Dept: FAMILY MEDICINE CLINIC | Facility: CLINIC | Age: 28
End: 2023-08-09
Payer: COMMERCIAL

## 2023-08-09 VITALS
DIASTOLIC BLOOD PRESSURE: 68 MMHG | TEMPERATURE: 97.5 F | BODY MASS INDEX: 34.96 KG/M2 | WEIGHT: 236 LBS | SYSTOLIC BLOOD PRESSURE: 130 MMHG | HEIGHT: 69 IN | HEART RATE: 80 BPM | OXYGEN SATURATION: 98 %

## 2023-08-09 DIAGNOSIS — M54.9 MECHANICAL BACK PAIN: Primary | ICD-10-CM

## 2023-08-09 PROCEDURE — 99213 OFFICE O/P EST LOW 20 MIN: CPT | Performed by: FAMILY MEDICINE

## 2023-08-09 RX ORDER — MELOXICAM 15 MG/1
15 TABLET ORAL DAILY
Qty: 30 TABLET | Refills: 0 | Status: SHIPPED | OUTPATIENT
Start: 2023-08-09
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
## 2023-08-25 ENCOUNTER — OFFICE VISIT (OUTPATIENT)
Dept: FAMILY MEDICINE CLINIC | Facility: CLINIC | Age: 28
End: 2023-08-25
Payer: COMMERCIAL

## 2023-08-25 VITALS
DIASTOLIC BLOOD PRESSURE: 80 MMHG | TEMPERATURE: 97.3 F | SYSTOLIC BLOOD PRESSURE: 110 MMHG | OXYGEN SATURATION: 98 % | HEIGHT: 69 IN | BODY MASS INDEX: 35.1 KG/M2 | HEART RATE: 87 BPM | WEIGHT: 237 LBS

## 2023-08-25 DIAGNOSIS — G47.00 INSOMNIA, UNSPECIFIED TYPE: Primary | ICD-10-CM

## 2023-08-25 DIAGNOSIS — R53.83 FATIGUE, UNSPECIFIED TYPE: ICD-10-CM

## 2023-08-25 DIAGNOSIS — Z13.220 SCREENING CHOLESTEROL LEVEL: ICD-10-CM

## 2023-08-25 DIAGNOSIS — E66.01 CLASS 2 SEVERE OBESITY DUE TO EXCESS CALORIES WITH SERIOUS COMORBIDITY AND BODY MASS INDEX (BMI) OF 35.0 TO 35.9 IN ADULT: ICD-10-CM

## 2023-08-25 LAB
ALBUMIN SERPL-MCNC: 4.4 G/DL (ref 3.5–5.2)
ALBUMIN/GLOB SERPL: 1.8 G/DL
ALP SERPL-CCNC: 58 U/L (ref 39–117)
ALT SERPL W P-5'-P-CCNC: 58 U/L (ref 1–41)
ANION GAP SERPL CALCULATED.3IONS-SCNC: 12 MMOL/L (ref 5–15)
AST SERPL-CCNC: 26 U/L (ref 1–40)
BASOPHILS # BLD AUTO: 0.03 10*3/MM3 (ref 0–0.2)
BASOPHILS NFR BLD AUTO: 0.3 % (ref 0–1.5)
BILIRUB SERPL-MCNC: 0.3 MG/DL (ref 0–1.2)
BUN SERPL-MCNC: 15 MG/DL (ref 6–20)
BUN/CREAT SERPL: 17.9 (ref 7–25)
CALCIUM SPEC-SCNC: 9.6 MG/DL (ref 8.6–10.5)
CHLORIDE SERPL-SCNC: 106 MMOL/L (ref 98–107)
CO2 SERPL-SCNC: 22 MMOL/L (ref 22–29)
CREAT SERPL-MCNC: 0.84 MG/DL (ref 0.76–1.27)
DEPRECATED RDW RBC AUTO: 40.1 FL (ref 37–54)
EGFRCR SERPLBLD CKD-EPI 2021: 121.1 ML/MIN/1.73
EOSINOPHIL # BLD AUTO: 0.5 10*3/MM3 (ref 0–0.4)
EOSINOPHIL NFR BLD AUTO: 4.8 % (ref 0.3–6.2)
ERYTHROCYTE [DISTWIDTH] IN BLOOD BY AUTOMATED COUNT: 13.1 % (ref 12.3–15.4)
FOLATE SERPL-MCNC: 7.49 NG/ML (ref 4.78–24.2)
GLOBULIN UR ELPH-MCNC: 2.4 GM/DL
GLUCOSE SERPL-MCNC: 87 MG/DL (ref 65–99)
HCT VFR BLD AUTO: 43.4 % (ref 37.5–51)
HGB BLD-MCNC: 14.8 G/DL (ref 13–17.7)
IMM GRANULOCYTES # BLD AUTO: 0.04 10*3/MM3 (ref 0–0.05)
IMM GRANULOCYTES NFR BLD AUTO: 0.4 % (ref 0–0.5)
LYMPHOCYTES # BLD AUTO: 3.87 10*3/MM3 (ref 0.7–3.1)
LYMPHOCYTES NFR BLD AUTO: 37.5 % (ref 19.6–45.3)
MCH RBC QN AUTO: 29 PG (ref 26.6–33)
MCHC RBC AUTO-ENTMCNC: 34.1 G/DL (ref 31.5–35.7)
MCV RBC AUTO: 84.9 FL (ref 79–97)
MONOCYTES # BLD AUTO: 0.94 10*3/MM3 (ref 0.1–0.9)
MONOCYTES NFR BLD AUTO: 9.1 % (ref 5–12)
NEUTROPHILS NFR BLD AUTO: 4.93 10*3/MM3 (ref 1.7–7)
NEUTROPHILS NFR BLD AUTO: 47.9 % (ref 42.7–76)
NRBC BLD AUTO-RTO: 0 /100 WBC (ref 0–0.2)
PLATELET # BLD AUTO: 264 10*3/MM3 (ref 140–450)
PMV BLD AUTO: 10.7 FL (ref 6–12)
POTASSIUM SERPL-SCNC: 3.9 MMOL/L (ref 3.5–5.2)
PROT SERPL-MCNC: 6.8 G/DL (ref 6–8.5)
RBC # BLD AUTO: 5.11 10*6/MM3 (ref 4.14–5.8)
SODIUM SERPL-SCNC: 140 MMOL/L (ref 136–145)
T4 FREE SERPL-MCNC: 1.32 NG/DL (ref 0.93–1.7)
TSH SERPL DL<=0.05 MIU/L-ACNC: 1.7 UIU/ML (ref 0.27–4.2)
VIT B12 BLD-MCNC: 361 PG/ML (ref 211–946)
WBC NRBC COR # BLD: 10.31 10*3/MM3 (ref 3.4–10.8)

## 2023-08-25 PROCEDURE — 82607 VITAMIN B-12: CPT | Performed by: FAMILY MEDICINE

## 2023-08-25 PROCEDURE — 80050 GENERAL HEALTH PANEL: CPT | Performed by: FAMILY MEDICINE

## 2023-08-25 PROCEDURE — 84439 ASSAY OF FREE THYROXINE: CPT | Performed by: FAMILY MEDICINE

## 2023-08-25 PROCEDURE — 82746 ASSAY OF FOLIC ACID SERUM: CPT | Performed by: FAMILY MEDICINE

## 2023-08-25 RX ORDER — TRAZODONE HYDROCHLORIDE 50 MG/1
50 TABLET ORAL NIGHTLY PRN
Qty: 30 TABLET | Refills: 1 | Status: SHIPPED | OUTPATIENT
Start: 2023-08-25

## 2023-08-25 NOTE — PROGRESS NOTES
Chief Complaint  Fatigue, Insomnia, and Annual Exam    Subjective          Mando Hawk presents to [facility]  History of Present Illness  Pt has trouble going to sleep over last few weeks- he wakes up a lot- back no longer hurting- pt says that he just has trouble sleeping    Pt says back pain has stopped      Class 2 Severe Obesity (BMI >=35 and <=39.9). Obesity-related health conditions include the following: none. Obesity is improving with lifestyle modifications. BMI is is above average; BMI management plan is completed. We discussed portion control and increasing exercise.       Objective   No Known Allergies  Immunization History   Administered Date(s) Administered    COVID-19 (MODERNA) 1st,2nd,3rd Dose Monovalent 05/21/2021, 06/26/2021    COVID-19 (MODERNA) Monovalent Original Booster 01/27/2022    DTP / HiB 08/13/1998    DTaP, Unspecified 04/10/1996    HPV Quadrivalent 05/23/2014    HPV, Unspecified 05/23/2014    Hepatitis A 05/23/2014    MMR 08/13/1998    Meningococcal MCV4P (Menactra) 05/23/2014    Meningococcal Polysaccharide 05/23/2014    OPV 08/13/1998    Td (TDVAX) 07/28/2005    Tdap 05/23/2014     Past Medical History:   Diagnosis Date    Anxiety     Depression     GERD (gastroesophageal reflux disease)       Past Surgical History:   Procedure Laterality Date    WISDOM TOOTH EXTRACTION        Social History     Socioeconomic History    Marital status: Single   Tobacco Use    Smoking status: Never    Smokeless tobacco: Never   Vaping Use    Vaping Use: Never used   Substance and Sexual Activity    Alcohol use: Not Currently     Comment: soc.    Drug use: Yes     Types: Marijuana    Sexual activity: Not Currently        Current Outpatient Medications:     traZODone (DESYREL) 50 MG tablet, Take 1 tablet by mouth At Night As Needed for Sleep., Disp: 30 tablet, Rfl: 1   Family History   Problem Relation Age of Onset    No Known Problems Mother     No Known Problems  "Father           Vital Signs:   Vitals:    08/25/23 0836   BP: 110/80   BP Location: Left arm   Patient Position: Sitting   Cuff Size: Adult   Pulse: 87   Temp: 97.3 øF (36.3 øC)   SpO2: 98%   Weight: 108 kg (237 lb)   Height: 175.3 cm (69\")       Review of Systems   Constitutional:  Positive for fatigue. Negative for fever.   HENT:  Negative for sore throat.    Eyes:  Negative for visual disturbance.   Respiratory:  Negative for cough, chest tightness, shortness of breath and wheezing.    Cardiovascular:  Negative for chest pain, palpitations and leg swelling.   Gastrointestinal:  Negative for abdominal pain, diarrhea, nausea and vomiting.   Skin:  Negative for rash.   Neurological:  Negative for light-headedness and headaches.    Physical Exam  Vitals reviewed.   Constitutional:       Appearance: Normal appearance. He is well-developed.   HENT:      Head: Normocephalic and atraumatic.      Right Ear: External ear normal.      Left Ear: External ear normal.      Mouth/Throat:      Pharynx: No oropharyngeal exudate.   Eyes:      Conjunctiva/sclera: Conjunctivae normal.      Pupils: Pupils are equal, round, and reactive to light.   Cardiovascular:      Rate and Rhythm: Normal rate and regular rhythm.      Pulses: Normal pulses.      Heart sounds: Normal heart sounds. No murmur heard.    No friction rub. No gallop.   Pulmonary:      Effort: Pulmonary effort is normal.      Breath sounds: Normal breath sounds. No wheezing or rhonchi.   Abdominal:      General: Abdomen is flat. Bowel sounds are normal. There is no distension.      Palpations: Abdomen is soft. There is no mass.      Tenderness: There is no abdominal tenderness. There is no guarding or rebound.      Hernia: No hernia is present.   Musculoskeletal:         General: Normal range of motion.   Skin:     General: Skin is warm and dry.      Capillary Refill: Capillary refill takes less than 2 seconds.   Neurological:      General: No focal deficit present.      " Mental Status: He is alert and oriented to person, place, and time.      Cranial Nerves: No cranial nerve deficit.   Psychiatric:         Mood and Affect: Mood and affect normal.         Behavior: Behavior normal.         Thought Content: Thought content normal.         Judgment: Judgment normal.      Result Review :   The following data was reviewed by: Jostin Medrano MD on 08/25/2023:                    Assessment and Plan    Diagnoses and all orders for this visit:    1. Insomnia, unspecified type (Primary)  -     traZODone (DESYREL) 50 MG tablet; Take 1 tablet by mouth At Night As Needed for Sleep.  Dispense: 30 tablet; Refill: 1    2. Fatigue, unspecified type  -     CBC Auto Differential  -     Comprehensive Metabolic Panel  -     Vitamin B12 & Folate  -     TSH+Free T4    3. Screening cholesterol level  -     Lipid Panel; Future            Follow Up   Return in about 1 month (around 9/25/2023) for Recheck.  Patient was given instructions and counseling regarding his condition or for health maintenance advice. Please see specific information pulled into the AVS if appropriate.

## 2023-08-25 NOTE — PROGRESS NOTES
Venipuncture Blood Specimen Collection  Venipuncture performed in left arm by Alma Meek with good hemostasis. Patient tolerated the procedure well without complications.   08/25/23   Alma Meek

## 2023-09-01 ENCOUNTER — TELEPHONE (OUTPATIENT)
Dept: FAMILY MEDICINE CLINIC | Facility: CLINIC | Age: 28
End: 2023-09-01
Payer: COMMERCIAL

## 2023-09-01 NOTE — TELEPHONE ENCOUNTER
Seems like we already completed one of these. Completed the one on my desk with pretty much the same information.     Thank you!

## 2023-09-01 NOTE — TELEPHONE ENCOUNTER
Patient calls to check the status of updated disability form that was dropped off on 08/25?  States that his work is needing form by end of today if possible so that he can get paid for this period.

## 2023-09-12 ENCOUNTER — OFFICE VISIT (OUTPATIENT)
Dept: FAMILY MEDICINE CLINIC | Facility: CLINIC | Age: 28
End: 2023-09-12
Payer: COMMERCIAL

## 2023-09-12 VITALS
HEIGHT: 69 IN | SYSTOLIC BLOOD PRESSURE: 128 MMHG | DIASTOLIC BLOOD PRESSURE: 80 MMHG | TEMPERATURE: 98 F | BODY MASS INDEX: 35.1 KG/M2 | OXYGEN SATURATION: 97 % | HEART RATE: 83 BPM | WEIGHT: 237 LBS

## 2023-09-12 DIAGNOSIS — K62.5 RECTAL BLEEDING: ICD-10-CM

## 2023-09-12 DIAGNOSIS — R74.8 ELEVATED LIVER ENZYMES: ICD-10-CM

## 2023-09-12 DIAGNOSIS — K59.00 CONSTIPATION, UNSPECIFIED CONSTIPATION TYPE: ICD-10-CM

## 2023-09-12 DIAGNOSIS — Z13.220 SCREENING CHOLESTEROL LEVEL: ICD-10-CM

## 2023-09-12 LAB
ALBUMIN SERPL-MCNC: 4.7 G/DL (ref 3.5–5.2)
ALBUMIN/GLOB SERPL: 2.4 G/DL
ALP SERPL-CCNC: 67 U/L (ref 39–117)
ALT SERPL W P-5'-P-CCNC: 58 U/L (ref 1–41)
ANION GAP SERPL CALCULATED.3IONS-SCNC: 13 MMOL/L (ref 5–15)
AST SERPL-CCNC: 23 U/L (ref 1–40)
BASOPHILS # BLD AUTO: 0.02 10*3/MM3 (ref 0–0.2)
BASOPHILS NFR BLD AUTO: 0.2 % (ref 0–1.5)
BILIRUB SERPL-MCNC: 0.3 MG/DL (ref 0–1.2)
BUN SERPL-MCNC: 12 MG/DL (ref 6–20)
BUN/CREAT SERPL: 12.5 (ref 7–25)
CALCIUM SPEC-SCNC: 9.3 MG/DL (ref 8.6–10.5)
CHLORIDE SERPL-SCNC: 104 MMOL/L (ref 98–107)
CHOLEST SERPL-MCNC: 143 MG/DL (ref 0–200)
CO2 SERPL-SCNC: 24 MMOL/L (ref 22–29)
CREAT SERPL-MCNC: 0.96 MG/DL (ref 0.76–1.27)
DEPRECATED RDW RBC AUTO: 40.5 FL (ref 37–54)
EGFRCR SERPLBLD CKD-EPI 2021: 109.7 ML/MIN/1.73
EOSINOPHIL # BLD AUTO: 0.33 10*3/MM3 (ref 0–0.4)
EOSINOPHIL NFR BLD AUTO: 3.3 % (ref 0.3–6.2)
ERYTHROCYTE [DISTWIDTH] IN BLOOD BY AUTOMATED COUNT: 13.1 % (ref 12.3–15.4)
GLOBULIN UR ELPH-MCNC: 2 GM/DL
GLUCOSE SERPL-MCNC: 85 MG/DL (ref 65–99)
HCT VFR BLD AUTO: 43.2 % (ref 37.5–51)
HDLC SERPL-MCNC: 27 MG/DL (ref 40–60)
HGB BLD-MCNC: 14.8 G/DL (ref 13–17.7)
IMM GRANULOCYTES # BLD AUTO: 0.04 10*3/MM3 (ref 0–0.05)
IMM GRANULOCYTES NFR BLD AUTO: 0.4 % (ref 0–0.5)
LDLC SERPL CALC-MCNC: 83 MG/DL (ref 0–100)
LDLC/HDLC SERPL: 2.86 {RATIO}
LYMPHOCYTES # BLD AUTO: 3.73 10*3/MM3 (ref 0.7–3.1)
LYMPHOCYTES NFR BLD AUTO: 37 % (ref 19.6–45.3)
MCH RBC QN AUTO: 29.1 PG (ref 26.6–33)
MCHC RBC AUTO-ENTMCNC: 34.3 G/DL (ref 31.5–35.7)
MCV RBC AUTO: 85 FL (ref 79–97)
MONOCYTES # BLD AUTO: 1 10*3/MM3 (ref 0.1–0.9)
MONOCYTES NFR BLD AUTO: 9.9 % (ref 5–12)
NEUTROPHILS NFR BLD AUTO: 4.96 10*3/MM3 (ref 1.7–7)
NEUTROPHILS NFR BLD AUTO: 49.2 % (ref 42.7–76)
NRBC BLD AUTO-RTO: 0 /100 WBC (ref 0–0.2)
PLATELET # BLD AUTO: 247 10*3/MM3 (ref 140–450)
PMV BLD AUTO: 11.4 FL (ref 6–12)
POTASSIUM SERPL-SCNC: 3.9 MMOL/L (ref 3.5–5.2)
PROT SERPL-MCNC: 6.7 G/DL (ref 6–8.5)
RBC # BLD AUTO: 5.08 10*6/MM3 (ref 4.14–5.8)
SODIUM SERPL-SCNC: 141 MMOL/L (ref 136–145)
TRIGL SERPL-MCNC: 194 MG/DL (ref 0–150)
VLDLC SERPL-MCNC: 33 MG/DL (ref 5–40)
WBC NRBC COR # BLD: 10.08 10*3/MM3 (ref 3.4–10.8)

## 2023-09-12 PROCEDURE — 80061 LIPID PANEL: CPT | Performed by: FAMILY MEDICINE

## 2023-09-12 PROCEDURE — 85025 COMPLETE CBC W/AUTO DIFF WBC: CPT | Performed by: FAMILY MEDICINE

## 2023-09-12 PROCEDURE — 80053 COMPREHEN METABOLIC PANEL: CPT | Performed by: FAMILY MEDICINE

## 2023-09-12 RX ORDER — PANTOPRAZOLE SODIUM 40 MG/1
40 TABLET, DELAYED RELEASE ORAL DAILY
Qty: 30 TABLET | Refills: 1 | Status: SHIPPED | OUTPATIENT
Start: 2023-09-12

## 2023-09-12 NOTE — PROGRESS NOTES
.  Venipuncture Blood Specimen Collection  Venipuncture performed in LT arm by Pauline Huerta with good hemostasis. Patient tolerated the procedure well without complications.   09/12/23   Karen Snider MA

## 2023-09-12 NOTE — PROGRESS NOTES
Labs show elevated liver enzyme and elevated cholesterol.  Please get ultrasound of liver done.  Follow-up after this to discuss the ultrasound and your elevated cholesterol seen on the labs.

## 2023-09-12 NOTE — PROGRESS NOTES
Chief Complaint  lab results  and Rectal Bleeding    Subjective          Mando Hawk presents to [facility]  History of Present Illness  Discussed labs  Elevated liver enzymes    Pt had bright red blood with stool on Sunday only- pt said he had a fairly large amount- none since then  Rectal Bleeding  This is a new problem. Episode onset: had it 3 days ago- none since then. The problem occurs intermittently. The problem has been gradually improving. Associated symptoms comments: Intermittent constipation. Nothing aggravates the symptoms. He has tried nothing for the symptoms.              Objective   No Known Allergies  Immunization History   Administered Date(s) Administered    COVID-19 (MODERNA) 1st,2nd,3rd Dose Monovalent 05/21/2021, 06/26/2021    COVID-19 (MODERNA) Monovalent Original Booster 01/27/2022    DTP / HiB 08/13/1998    DTaP, Unspecified 04/10/1996    HPV Quadrivalent 05/23/2014    HPV, Unspecified 05/23/2014    Hepatitis A 05/23/2014    MMR 08/13/1998    Meningococcal MCV4P (Menactra) 05/23/2014    Meningococcal Polysaccharide 05/23/2014    OPV 08/13/1998    Td (TDVAX) 07/28/2005    Tdap 05/23/2014     Past Medical History:   Diagnosis Date    Anxiety     Depression     GERD (gastroesophageal reflux disease)       Past Surgical History:   Procedure Laterality Date    WISDOM TOOTH EXTRACTION        Social History     Socioeconomic History    Marital status: Single   Tobacco Use    Smoking status: Never    Smokeless tobacco: Never   Vaping Use    Vaping Use: Never used   Substance and Sexual Activity    Alcohol use: Not Currently     Comment: soc.    Drug use: Yes     Types: Marijuana    Sexual activity: Not Currently        Current Outpatient Medications:     traZODone (DESYREL) 50 MG tablet, Take 1 tablet by mouth At Night As Needed for Sleep., Disp: 30 tablet, Rfl: 1    linaclotide (Linzess) 145 MCG capsule capsule, Take 1 capsule by mouth Every Morning Before  "Breakfast., Disp: 30 capsule, Rfl: 1    pantoprazole (Protonix) 40 MG EC tablet, Take 1 tablet by mouth Daily., Disp: 30 tablet, Rfl: 1   Family History   Problem Relation Age of Onset    No Known Problems Mother     No Known Problems Father           Vital Signs:   Vitals:    09/12/23 0825   BP: 128/80   BP Location: Left arm   Patient Position: Sitting   Cuff Size: Adult   Pulse: 83   Temp: 98 °F (36.7 °C)   SpO2: 97%   Weight: 108 kg (237 lb)   Height: 175.3 cm (69\")       Review of Systems   Physical Exam  Vitals reviewed. Exam conducted with a chaperone present.   Constitutional:       Appearance: Normal appearance. He is well-developed.   HENT:      Head: Normocephalic and atraumatic.      Right Ear: External ear normal.      Left Ear: External ear normal.      Mouth/Throat:      Pharynx: No oropharyngeal exudate.   Eyes:      Conjunctiva/sclera: Conjunctivae normal.      Pupils: Pupils are equal, round, and reactive to light.   Cardiovascular:      Rate and Rhythm: Normal rate and regular rhythm.      Pulses: Normal pulses.      Heart sounds: Normal heart sounds. No murmur heard.    No friction rub. No gallop.   Pulmonary:      Effort: Pulmonary effort is normal.      Breath sounds: Normal breath sounds. No wheezing or rhonchi.   Abdominal:      General: Abdomen is flat. Bowel sounds are normal. There is no distension.      Palpations: Abdomen is soft. There is no mass.      Tenderness: There is no abdominal tenderness. There is no guarding or rebound.      Hernia: No hernia is present.   Genitourinary:     Rectum: Normal.      Comments: No masses palpated in rectum, no bleeding seen, no hemorrhoids seen.  Musculoskeletal:         General: Normal range of motion.   Skin:     General: Skin is warm and dry.      Capillary Refill: Capillary refill takes less than 2 seconds.   Neurological:      General: No focal deficit present.      Mental Status: He is alert and oriented to person, place, and time.      " Cranial Nerves: No cranial nerve deficit.   Psychiatric:         Mood and Affect: Mood and affect normal.         Behavior: Behavior normal.         Thought Content: Thought content normal.         Judgment: Judgment normal.      Result Review :                 Assessment and Plan    Diagnoses and all orders for this visit:    1. BMI 35.0-35.9,adult (Primary)    2. Elevated liver enzymes  -     US Abdomen Limited; Future  -     Comprehensive Metabolic Panel    3. Rectal bleeding  -     CBC Auto Differential  -     Occult Blood X 1, Stool - Stool, Per Rectum  -     pantoprazole (Protonix) 40 MG EC tablet; Take 1 tablet by mouth Daily.  Dispense: 30 tablet; Refill: 1  -     Ambulatory Referral to Gastroenterology    4. Constipation, unspecified constipation type  -     Ambulatory Referral to Gastroenterology  -     linaclotide (Linzess) 145 MCG capsule capsule; Take 1 capsule by mouth Every Morning Before Breakfast.  Dispense: 30 capsule; Refill: 1            Follow Up   Return in about 3 weeks (around 10/3/2023) for Recheck.  Patient was given instructions and counseling regarding his condition or for health maintenance advice. Please see specific information pulled into the AVS if appropriate.

## 2023-10-17 ENCOUNTER — OFFICE VISIT (OUTPATIENT)
Dept: GASTROENTEROLOGY | Facility: CLINIC | Age: 28
End: 2023-10-17
Payer: COMMERCIAL

## 2023-10-17 ENCOUNTER — PREP FOR SURGERY (OUTPATIENT)
Dept: SURGERY | Facility: SURGERY CENTER | Age: 28
End: 2023-10-17
Payer: COMMERCIAL

## 2023-10-17 VITALS
HEART RATE: 72 BPM | SYSTOLIC BLOOD PRESSURE: 120 MMHG | HEIGHT: 69 IN | OXYGEN SATURATION: 96 % | DIASTOLIC BLOOD PRESSURE: 80 MMHG | WEIGHT: 240.1 LBS | TEMPERATURE: 96.7 F | BODY MASS INDEX: 35.56 KG/M2

## 2023-10-17 DIAGNOSIS — K21.9 GASTROESOPHAGEAL REFLUX DISEASE, UNSPECIFIED WHETHER ESOPHAGITIS PRESENT: ICD-10-CM

## 2023-10-17 DIAGNOSIS — R14.0 BLOATING: ICD-10-CM

## 2023-10-17 DIAGNOSIS — R10.30 LOWER ABDOMINAL PAIN: Primary | ICD-10-CM

## 2023-10-17 DIAGNOSIS — K62.5 RECTAL BLEEDING: ICD-10-CM

## 2023-10-17 DIAGNOSIS — R11.0 NAUSEA: ICD-10-CM

## 2023-10-17 DIAGNOSIS — R74.8 ELEVATED LIVER ENZYMES: ICD-10-CM

## 2023-10-17 PROCEDURE — 99214 OFFICE O/P EST MOD 30 MIN: CPT | Performed by: NURSE PRACTITIONER

## 2023-10-17 RX ORDER — SODIUM CHLORIDE 0.9 % (FLUSH) 0.9 %
3 SYRINGE (ML) INJECTION EVERY 12 HOURS SCHEDULED
OUTPATIENT
Start: 2023-10-17

## 2023-10-17 RX ORDER — DICYCLOMINE HYDROCHLORIDE 10 MG/1
10 CAPSULE ORAL 3 TIMES DAILY PRN
Qty: 90 CAPSULE | Refills: 5 | Status: SHIPPED | OUTPATIENT
Start: 2023-10-17

## 2023-10-17 RX ORDER — SODIUM CHLORIDE, SODIUM LACTATE, POTASSIUM CHLORIDE, CALCIUM CHLORIDE 600; 310; 30; 20 MG/100ML; MG/100ML; MG/100ML; MG/100ML
30 INJECTION, SOLUTION INTRAVENOUS CONTINUOUS PRN
OUTPATIENT
Start: 2023-10-17

## 2023-10-17 RX ORDER — SODIUM CHLORIDE 0.9 % (FLUSH) 0.9 %
10 SYRINGE (ML) INJECTION AS NEEDED
OUTPATIENT
Start: 2023-10-17

## 2023-10-17 NOTE — PROGRESS NOTES
"Chief Complaint   Patient presents with    Abdominal Pain    GI Bleeding         History of Present Illness  Patient is a 28-year-old male who presents today for Evaluation.  Referred for rectal bleeding and constipation.  Recent CBC was normal with no evidence of anemia.  Also noted to have mildly elevated ALT dating back to at least 1 year ago.    Patient presents today for evaluation with concerns about blood in the stool.  Reports he has had 2-3 episodes of this over the last year but in mid September experienced a worse episode where there was more blood than he has seen previously.  Blood was described as being in the stool.  He had no associated abdominal pain or rectal pain.  He saw his primary care provider and lab work was obtained and he was evaluated for hemorrhoids with nothing found.    Patient reports he has also been experiencing abdominal pain.  Pain is present to the lower abdomen and described as a cramping sensation.  He reports abdominal bloating.    He generally has a bowel movement 1-2 times per day but does not feel he empties completely.  Previously tried Linzess which caused diarrhea.    Reports occasional nausea and dry heaving.    He has been experiencing reflux symptoms over the last 3 to 4 months with belching and regurgitation.  He has been taking pantoprazole with no improvement.    Denies any pertinent GI family history.  No prior abdominal surgeries.    Regarding liver enzyme elevation, ultrasound is pending to evaluate this.     Result Review :       CT Abdomen Pelvis Without Contrast (03/29/2022 18:17)    Comprehensive Metabolic Panel (09/12/2023 09:01)    CBC Auto Differential (09/12/2023 09:01)    Office Visit with Jostin Medrano MD (09/12/2023)    Referral to Gastroenterology for Rectal bleeding; Constipation, unspecified constipation type (09/12/2023)    Vital Signs:   /80   Pulse 72   Temp 96.7 °F (35.9 °C)   Ht 175.3 cm (69.02\")   Wt 109 kg (240 lb 1.6 oz)  "  SpO2 96%   BMI 35.44 kg/m²     Body mass index is 35.44 kg/m².     Physical Exam  Vitals reviewed.   Constitutional:       General: He is not in acute distress.     Appearance: He is well-developed.   HENT:      Head: Normocephalic and atraumatic.   Pulmonary:      Effort: Pulmonary effort is normal. No respiratory distress.   Abdominal:      General: Abdomen is flat. Bowel sounds are normal. There is no distension.      Palpations: Abdomen is soft. There is no hepatomegaly.      Tenderness: There is no abdominal tenderness.   Skin:     General: Skin is dry.      Coloration: Skin is not pale.   Neurological:      Mental Status: He is alert and oriented to person, place, and time.   Psychiatric:         Thought Content: Thought content normal.           Assessment and Plan    Diagnoses and all orders for this visit:    1. Lower abdominal pain (Primary)    2. Rectal bleeding    3. Nausea    4. Gastroesophageal reflux disease, unspecified whether esophagitis present    5. Bloating    6. Elevated liver enzymes  -     Alpha - 1 - Antitrypsin  -     KYLEE  -     Anti-Smooth Muscle Antibody Titer  -     Ferritin  -     Celiac Disease Panel  -     Ceruloplasmin  -     Gamma GT  -     Hepatitis Panel, Acute  -     IgG, IgA, IgM  -     Iron Profile  -     Mitochondrial Antibodies, M2    Other orders  -     dicyclomine (BENTYL) 10 MG capsule; Take 1 capsule by mouth 3 (Three) Times a Day As Needed for Abdominal Cramping.  Dispense: 90 capsule; Refill: 5         Discussion  Patient presents today for evaluation with concerns about abdominal pain, rectal bleeding, GERD, and elevated liver enzymes.  Recommended EGD and colonoscopy.  EGD to evaluate for any evidence of esophagitis, hiatal hernia, peptic ulcer disease, H. pylori infection, or celiac disease that could be contributing to symptoms.  Recommended colonoscopy to evaluate source of bleeding, assess for any evidence of proctitis, rectal ulcer, colitis, internal  hemorrhoids that could be contributing to symptoms.  If negative, bowel symptoms likely secondary to IBS.  Will provide prescription for dicyclomine to try for abdominal cramping and recommended starting MiraLAX to promote more complete evacuation.    Regarding elevated liver enzymes, ultrasound is pending.  Suspect this is likely secondary to fatty liver.  We will check lab work today to evaluate for alternative source and await ultrasound results.          Follow Up   Return for Follow up to review results after testing complete.    Patient Instructions   Schedule EGD and colonoscopy for further evaluation.     For constipation, recommend starting MiraLAX daily available over-the-counter.  Mix 1 capful in 8 ounces of noncarbonated liquid and drink once daily.     For abdominal cramping begin trial of dicyclomine as prescribed. Prescription sent to pharmacy.     Check lab work today to evaluate cause of liver enzyme elevation.

## 2023-10-17 NOTE — PATIENT INSTRUCTIONS
Schedule EGD and colonoscopy for further evaluation.     For constipation, recommend starting MiraLAX daily available over-the-counter.  Mix 1 capful in 8 ounces of noncarbonated liquid and drink once daily.     For abdominal cramping begin trial of dicyclomine as prescribed. Prescription sent to pharmacy.     Check lab work today to evaluate cause of liver enzyme elevation.

## 2023-10-20 ENCOUNTER — OUTSIDE FACILITY SERVICE (OUTPATIENT)
Dept: GASTROENTEROLOGY | Facility: CLINIC | Age: 28
End: 2023-10-20
Payer: COMMERCIAL

## 2023-10-20 ENCOUNTER — LAB REQUISITION (OUTPATIENT)
Dept: LAB | Facility: HOSPITAL | Age: 28
End: 2023-10-20
Payer: COMMERCIAL

## 2023-10-20 DIAGNOSIS — R12 HEARTBURN: ICD-10-CM

## 2023-10-20 PROCEDURE — 45378 DIAGNOSTIC COLONOSCOPY: CPT | Performed by: INTERNAL MEDICINE

## 2023-10-20 PROCEDURE — 43239 EGD BIOPSY SINGLE/MULTIPLE: CPT | Performed by: INTERNAL MEDICINE

## 2023-10-20 PROCEDURE — 88305 TISSUE EXAM BY PATHOLOGIST: CPT | Performed by: INTERNAL MEDICINE

## 2023-10-23 LAB
LAB AP CASE REPORT: NORMAL
PATH REPORT.FINAL DX SPEC: NORMAL
PATH REPORT.GROSS SPEC: NORMAL

## 2024-02-08 ENCOUNTER — OFFICE VISIT (OUTPATIENT)
Dept: FAMILY MEDICINE CLINIC | Facility: CLINIC | Age: 29
End: 2024-02-08
Payer: COMMERCIAL

## 2024-02-08 VITALS
TEMPERATURE: 97.5 F | DIASTOLIC BLOOD PRESSURE: 85 MMHG | BODY MASS INDEX: 34.1 KG/M2 | SYSTOLIC BLOOD PRESSURE: 120 MMHG | OXYGEN SATURATION: 97 % | WEIGHT: 231 LBS | HEART RATE: 76 BPM

## 2024-02-08 DIAGNOSIS — F41.9 ANXIETY AND DEPRESSION: Primary | ICD-10-CM

## 2024-02-08 DIAGNOSIS — M54.42 ACUTE LEFT-SIDED LOW BACK PAIN WITH LEFT-SIDED SCIATICA: ICD-10-CM

## 2024-02-08 DIAGNOSIS — F32.A ANXIETY AND DEPRESSION: Primary | ICD-10-CM

## 2024-02-08 LAB
ALBUMIN SERPL-MCNC: 4.4 G/DL (ref 3.5–5.2)
ALBUMIN/GLOB SERPL: 1.7 G/DL
ALP SERPL-CCNC: 65 U/L (ref 39–117)
ALT SERPL W P-5'-P-CCNC: 71 U/L (ref 1–41)
ANION GAP SERPL CALCULATED.3IONS-SCNC: 12.4 MMOL/L (ref 5–15)
AST SERPL-CCNC: 24 U/L (ref 1–40)
BASOPHILS # BLD AUTO: 0.02 10*3/MM3 (ref 0–0.2)
BASOPHILS NFR BLD AUTO: 0.2 % (ref 0–1.5)
BILIRUB SERPL-MCNC: 0.6 MG/DL (ref 0–1.2)
BUN SERPL-MCNC: 15 MG/DL (ref 6–20)
BUN/CREAT SERPL: 16.5 (ref 7–25)
CALCIUM SPEC-SCNC: 9.4 MG/DL (ref 8.6–10.5)
CHLORIDE SERPL-SCNC: 103 MMOL/L (ref 98–107)
CO2 SERPL-SCNC: 23.6 MMOL/L (ref 22–29)
CREAT SERPL-MCNC: 0.91 MG/DL (ref 0.76–1.27)
DEPRECATED RDW RBC AUTO: 39.9 FL (ref 37–54)
EGFRCR SERPLBLD CKD-EPI 2021: 116.3 ML/MIN/1.73
EOSINOPHIL # BLD AUTO: 0.29 10*3/MM3 (ref 0–0.4)
EOSINOPHIL NFR BLD AUTO: 2.7 % (ref 0.3–6.2)
ERYTHROCYTE [DISTWIDTH] IN BLOOD BY AUTOMATED COUNT: 13.1 % (ref 12.3–15.4)
FOLATE SERPL-MCNC: 8.79 NG/ML (ref 4.78–24.2)
GLOBULIN UR ELPH-MCNC: 2.6 GM/DL
GLUCOSE SERPL-MCNC: 96 MG/DL (ref 65–99)
HCT VFR BLD AUTO: 46.7 % (ref 37.5–51)
HGB BLD-MCNC: 15.8 G/DL (ref 13–17.7)
IMM GRANULOCYTES # BLD AUTO: 0.03 10*3/MM3 (ref 0–0.05)
IMM GRANULOCYTES NFR BLD AUTO: 0.3 % (ref 0–0.5)
LYMPHOCYTES # BLD AUTO: 3.59 10*3/MM3 (ref 0.7–3.1)
LYMPHOCYTES NFR BLD AUTO: 33.7 % (ref 19.6–45.3)
MCH RBC QN AUTO: 29 PG (ref 26.6–33)
MCHC RBC AUTO-ENTMCNC: 33.8 G/DL (ref 31.5–35.7)
MCV RBC AUTO: 85.8 FL (ref 79–97)
MONOCYTES # BLD AUTO: 1 10*3/MM3 (ref 0.1–0.9)
MONOCYTES NFR BLD AUTO: 9.4 % (ref 5–12)
NEUTROPHILS NFR BLD AUTO: 5.72 10*3/MM3 (ref 1.7–7)
NEUTROPHILS NFR BLD AUTO: 53.7 % (ref 42.7–76)
NRBC BLD AUTO-RTO: 0 /100 WBC (ref 0–0.2)
PLATELET # BLD AUTO: 269 10*3/MM3 (ref 140–450)
PMV BLD AUTO: 11.1 FL (ref 6–12)
POTASSIUM SERPL-SCNC: 4 MMOL/L (ref 3.5–5.2)
PROT SERPL-MCNC: 7 G/DL (ref 6–8.5)
RBC # BLD AUTO: 5.44 10*6/MM3 (ref 4.14–5.8)
SODIUM SERPL-SCNC: 139 MMOL/L (ref 136–145)
T4 FREE SERPL-MCNC: 1.55 NG/DL (ref 0.93–1.7)
TSH SERPL DL<=0.05 MIU/L-ACNC: 3.31 UIU/ML (ref 0.27–4.2)
VIT B12 BLD-MCNC: 411 PG/ML (ref 211–946)
WBC NRBC COR # BLD AUTO: 10.65 10*3/MM3 (ref 3.4–10.8)

## 2024-02-08 PROCEDURE — 82607 VITAMIN B-12: CPT | Performed by: FAMILY MEDICINE

## 2024-02-08 PROCEDURE — 82746 ASSAY OF FOLIC ACID SERUM: CPT | Performed by: FAMILY MEDICINE

## 2024-02-08 PROCEDURE — 84439 ASSAY OF FREE THYROXINE: CPT | Performed by: FAMILY MEDICINE

## 2024-02-08 PROCEDURE — 80050 GENERAL HEALTH PANEL: CPT | Performed by: FAMILY MEDICINE

## 2024-02-08 RX ORDER — PREDNISONE 20 MG/1
40 TABLET ORAL DAILY
Qty: 10 TABLET | Refills: 0 | Status: SHIPPED | OUTPATIENT
Start: 2024-02-08 | End: 2024-02-13

## 2024-02-08 RX ORDER — HYDROXYZINE HYDROCHLORIDE 10 MG/1
10 TABLET, FILM COATED ORAL 3 TIMES DAILY PRN
Qty: 90 TABLET | Refills: 1 | Status: SHIPPED | OUTPATIENT
Start: 2024-02-08

## 2024-02-08 RX ORDER — CYCLOBENZAPRINE HCL 5 MG
5 TABLET ORAL 3 TIMES DAILY PRN
Qty: 30 TABLET | Refills: 0 | Status: SHIPPED | OUTPATIENT
Start: 2024-02-08

## 2024-02-08 NOTE — PROGRESS NOTES
Chief Complaint  Back Pain (Since Tuesday/) and Depression    Subjective          Mando Hawk presents to [facility]  History of Present Illness  Pt has no SI or HI- feels safe currently- he agrees to go straight to ER if he has any SI or hI.  Back Pain  This is a new problem. Episode onset: 3 days. The problem occurs constantly. The problem is unchanged. The pain is present in the lumbar spine. The quality of the pain is described as aching. The pain radiates to the left foot. The pain is moderate. The symptoms are aggravated by bending, lying down, standing and twisting. Pertinent negatives include no abdominal pain, bladder incontinence, bowel incontinence, chest pain, dysuria, fever, headaches, leg pain, numbness, paresis, paresthesias, pelvic pain, perianal numbness, tingling, weakness or weight loss. He has tried NSAIDs for the symptoms. The treatment provided no relief.   Depression  Visit Type: initial  Onset of symptoms: more than 5 years ago  Progression since onset: gradually worsening  Patient presents with the following symptoms: depressed mood and excessive worry.  Patient is not experiencing: anhedonia, chest pain, choking sensation, compulsions, confusion, decreased concentration, dizziness, dry mouth, fatigue, feelings of hopelessness, feelings of worthlessness, hypersomnia, hyperventilation, insomnia, irritability, malaise, memory impairment, muscle tension, nausea, nervousness/anxiety, obsessions, palpitations, panic, psychomotor agitation, psychomotor retardation, restlessness, shortness of breath, suicidal ideas, suicidal planning, thoughts of death, weight gain and weight loss.  Frequency of symptoms: constantly   Severity: moderate   Sleep quality: poor  Nighttime awakenings: several  Treatment tried: SSRI  Compliance with treatment: good  Improvement on treatment: no relief                 Objective   No Known Allergies  Immunization History    Administered Date(s) Administered    COVID-19 (MODERNA) 1st,2nd,3rd Dose Monovalent 05/21/2021, 06/26/2021    COVID-19 (MODERNA) Monovalent Original Booster 01/27/2022    DTP / HiB 08/13/1998    DTaP, Unspecified 04/10/1996    HPV Quadrivalent 05/23/2014    HPV, Unspecified 05/23/2014    Hepatitis A 05/23/2014    MMR 08/13/1998    Meningococcal MCV4P (Menactra) 05/23/2014    Meningococcal Polysaccharide 05/23/2014    OPV 08/13/1998    Td (TDVAX) 07/28/2005    Tdap 05/23/2014     Past Medical History:   Diagnosis Date    Anxiety     Depression     GERD (gastroesophageal reflux disease)       Past Surgical History:   Procedure Laterality Date    COLONOSCOPY  11/2023    ENDOSCOPY  11/2023    WISDOM TOOTH EXTRACTION        Social History     Socioeconomic History    Marital status: Single   Tobacco Use    Smoking status: Never    Smokeless tobacco: Never   Vaping Use    Vaping Use: Never used   Substance and Sexual Activity    Alcohol use: Not Currently     Comment: soc.    Drug use: Yes     Types: Marijuana    Sexual activity: Not Currently        Current Outpatient Medications:     cyclobenzaprine (FLEXERIL) 5 MG tablet, Take 1 tablet by mouth 3 (Three) Times a Day As Needed for Muscle Spasms., Disp: 30 tablet, Rfl: 0    hydrOXYzine (ATARAX) 10 MG tablet, Take 1 tablet by mouth 3 (Three) Times a Day As Needed for Anxiety., Disp: 90 tablet, Rfl: 1    predniSONE (DELTASONE) 20 MG tablet, Take 2 tablets by mouth Daily for 5 days., Disp: 10 tablet, Rfl: 0    sertraline (Zoloft) 50 MG tablet, Take 1 tablet by mouth Daily., Disp: 30 tablet, Rfl: 1   Family History   Problem Relation Age of Onset    No Known Problems Mother     No Known Problems Father     No Known Problems Sister     No Known Problems Sister     Colon cancer Neg Hx     Colon polyps Neg Hx     Crohn's disease Neg Hx     Irritable bowel syndrome Neg Hx     Ulcerative colitis Neg Hx           Vital Signs:   Vitals:    02/08/24 1328 02/08/24 1413   BP: 120/90  120/85   BP Location: Right arm    Patient Position: Sitting    Cuff Size: Adult    Pulse: 76    Temp: 97.5 °F (36.4 °C)    SpO2: 97%    Weight: 105 kg (231 lb)        Review of Systems   Constitutional:  Negative for fever, irritability, weight gain and weight loss.   Respiratory:  Negative for choking and shortness of breath.    Cardiovascular:  Negative for chest pain and palpitations.   Gastrointestinal:  Negative for abdominal pain and bowel incontinence.   Genitourinary:  Negative for bladder incontinence, dysuria and pelvic pain.   Musculoskeletal:  Positive for back pain.   Neurological:  Negative for tingling, weakness, numbness, headaches and paresthesias.   Psychiatric/Behavioral:  Negative for confusion, decreased concentration and suicidal ideas. The patient is not nervous/anxious and does not have insomnia.       Physical Exam  Vitals reviewed.   Constitutional:       Appearance: Normal appearance. He is well-developed.   HENT:      Head: Normocephalic and atraumatic.      Right Ear: External ear normal.      Left Ear: External ear normal.      Mouth/Throat:      Pharynx: No oropharyngeal exudate.   Eyes:      Conjunctiva/sclera: Conjunctivae normal.      Pupils: Pupils are equal, round, and reactive to light.   Cardiovascular:      Rate and Rhythm: Normal rate and regular rhythm.      Pulses: Normal pulses.      Heart sounds: Normal heart sounds. No murmur heard.     No friction rub. No gallop.   Pulmonary:      Effort: Pulmonary effort is normal.      Breath sounds: Normal breath sounds. No wheezing or rhonchi.   Abdominal:      General: Abdomen is flat. Bowel sounds are normal. There is no distension.      Palpations: Abdomen is soft. There is no mass.      Tenderness: There is no abdominal tenderness. There is no guarding or rebound.      Hernia: No hernia is present.   Musculoskeletal:         General: Normal range of motion.      Comments: Left lower back paraspinal muscle tenderness, neg  straight leg raise, no vertebrae tenderness, no redness, warmth, swelling, or bruising.   Skin:     General: Skin is warm and dry.      Capillary Refill: Capillary refill takes less than 2 seconds.   Neurological:      General: No focal deficit present.      Mental Status: He is alert and oriented to person, place, and time.      Cranial Nerves: No cranial nerve deficit.   Psychiatric:         Mood and Affect: Mood and affect normal.         Behavior: Behavior normal.         Thought Content: Thought content normal.         Judgment: Judgment normal.        Result Review :   The following data was reviewed by: Jostin Medrano MD on 02/08/2024:  CMP          8/25/2023    09:11 9/12/2023    09:01   CMP   Glucose 87  85    BUN 15  12    Creatinine 0.84  0.96    EGFR 121.1  109.7    Sodium 140  141    Potassium 3.9  3.9    Chloride 106  104    Calcium 9.6  9.3    Total Protein 6.8  6.7    Albumin 4.4  4.7    Globulin 2.4  2.0    Total Bilirubin 0.3  0.3    Alkaline Phosphatase 58  67    AST (SGOT) 26  23    ALT (SGPT) 58  58    Albumin/Globulin Ratio 1.8  2.4    BUN/Creatinine Ratio 17.9  12.5    Anion Gap 12.0  13.0      CBC          8/25/2023    09:11 9/12/2023    09:01   CBC   WBC 10.31  10.08    RBC 5.11  5.08    Hemoglobin 14.8  14.8    Hematocrit 43.4  43.2    MCV 84.9  85.0    MCH 29.0  29.1    MCHC 34.1  34.3    RDW 13.1  13.1    Platelets 264  247      Lipid Panel          9/12/2023    09:01   Lipid Panel   Total Cholesterol 143    Triglycerides 194    HDL Cholesterol 27    VLDL Cholesterol 33    LDL Cholesterol  83    LDL/HDL Ratio 2.86      TSH          8/25/2023    09:11   TSH   TSH 1.700      Data reviewed : Radiologic studies I viewed and interpreted 2 views lumbar spine x-rays:no fxs.           Assessment and Plan    Diagnoses and all orders for this visit:    1. Anxiety and depression (Primary)  -     sertraline (Zoloft) 50 MG tablet; Take 1 tablet by mouth Daily.  Dispense: 30 tablet; Refill: 1  -      hydrOXYzine (ATARAX) 10 MG tablet; Take 1 tablet by mouth 3 (Three) Times a Day As Needed for Anxiety.  Dispense: 90 tablet; Refill: 1  -     CBC Auto Differential  -     Comprehensive Metabolic Panel  -     TSH+Free T4  -     Vitamin B12 & Folate    2. Acute left-sided low back pain with left-sided sciatica  -     XR Spine Lumbar 2 or 3 View (In Office)  -     CBC Auto Differential  -     Comprehensive Metabolic Panel  -     TSH+Free T4  -     Vitamin B12 & Folate  -     predniSONE (DELTASONE) 20 MG tablet; Take 2 tablets by mouth Daily for 5 days.  Dispense: 10 tablet; Refill: 0  -     cyclobenzaprine (FLEXERIL) 5 MG tablet; Take 1 tablet by mouth 3 (Three) Times a Day As Needed for Muscle Spasms.  Dispense: 30 tablet; Refill: 0            Follow Up   Return in about 2 weeks (around 2/22/2024) for Recheck.  Patient was given instructions and counseling regarding his condition or for health maintenance advice. Please see specific information pulled into the AVS if appropriate.

## 2024-02-08 NOTE — PROGRESS NOTES
Venipuncture Blood Specimen Collection  Venipuncture performed in left arm by verona pereira with good hemostasis. Patient tolerated the procedure well without complications.   02/08/24   Alma Meek

## 2024-09-11 ENCOUNTER — OFFICE VISIT (OUTPATIENT)
Dept: FAMILY MEDICINE CLINIC | Facility: CLINIC | Age: 29
End: 2024-09-11
Payer: COMMERCIAL

## 2024-09-11 VITALS
WEIGHT: 220.1 LBS | HEART RATE: 80 BPM | BODY MASS INDEX: 33.36 KG/M2 | OXYGEN SATURATION: 99 % | HEIGHT: 68 IN | DIASTOLIC BLOOD PRESSURE: 75 MMHG | TEMPERATURE: 97.7 F | SYSTOLIC BLOOD PRESSURE: 117 MMHG

## 2024-09-11 DIAGNOSIS — M54.50 ACUTE LEFT-SIDED LOW BACK PAIN WITHOUT SCIATICA: Primary | ICD-10-CM

## 2024-09-11 PROCEDURE — 99213 OFFICE O/P EST LOW 20 MIN: CPT | Performed by: FAMILY MEDICINE

## 2024-09-11 NOTE — PROGRESS NOTES
"Chief Complaint  Back Pain (Lower back pain after lifting heavy boxes 6 days ago.)    Subjective      Mando Hawk is a 29 y.o. male who presents to [facility]     Lower back pain after lifting heavy boxes about 6 days ago. Was worse the day after it happened. Lower left back hurts. Doesn't hurt more with specific motions. Had some numbness and tingling in the hip flexors and left hamstring initially but not anymore. No weakness. Has just been taking tylenol so far.     Objective   Vital Signs:   Vitals:    09/11/24 1520   BP: 117/75   Pulse: 80   Temp: 97.7 °F (36.5 °C)   SpO2: 99%   Weight: 99.8 kg (220 lb 1.6 oz)   Height: 171.5 cm (67.5\")   PainSc:   4     Body mass index is 33.96 kg/m².    Wt Readings from Last 3 Encounters:   09/11/24 99.8 kg (220 lb 1.6 oz)   02/08/24 105 kg (231 lb)   10/17/23 109 kg (240 lb 1.6 oz)     BP Readings from Last 3 Encounters:   09/11/24 117/75   02/08/24 120/85   10/17/23 120/80       Health Maintenance   Topic Date Due    HEPATITIS C SCREENING  Never done    TDAP/TD VACCINES (3 - Td or Tdap) 05/23/2024    ANNUAL PHYSICAL  08/25/2024    COVID-19 Vaccine (4 - 2023-24 season) 09/01/2024    INFLUENZA VACCINE  08/01/2024    BMI FOLLOWUP  09/11/2025    Pneumococcal Vaccine 0-64  Aged Out       Physical Exam  Vitals and nursing note reviewed.   Constitutional:       General: He is not in acute distress.  Cardiovascular:      Rate and Rhythm: Normal rate.   Pulmonary:      Effort: Pulmonary effort is normal. No respiratory distress.   Musculoskeletal:      Comments: Lower back range of motion is full.  Has some mild tenderness in left lumbar paraspinal muscles.  Ambulating independently.   Neurological:      Mental Status: He is alert.   Psychiatric:         Mood and Affect: Mood normal.         Behavior: Behavior normal.          Result Review :  The following data was reviewed by: Jonn Sandy MD on 09/11/2024:         Procedures      "     Assessment & Plan  Acute left-sided low back pain without sciatica  Musculoskeletal pain can be relieved with a variety of things. Over-the-counter Tylenol and NSAIDs (such as ibuprofen) can help. Use heat or ice on the affected area - pick the one that works best for you, don't use both. Topicals such as lidocaine patches or voltaren gel can help. Muscle relaxers (such as tizanidine or cyclobenzaprine) can also help. Muscle relaxers cause drowsiness, so only take them at night. Or, if you take them during the day, do not drive or lift anything heavy while on them. Get plenty of rest and drink plenty of water.             BMI is >= 30 and <35. (Class 1 Obesity). The following options were offered after discussion;: information on healthy weight added to patient's after visit summary          FOLLOW UP  Return if symptoms worsen or fail to improve.  Patient was given instructions and counseling regarding his condition or for health maintenance advice. Please see specific information pulled into the AVS if appropriate.       Jonn Sandy MD  09/11/24  15:33 EDT    CURRENT & DISCONTINUED MEDICATIONS  Current Outpatient Medications   Medication Instructions    cyclobenzaprine (FLEXERIL) 5 mg, Oral, 3 Times Daily PRN    hydrOXYzine (ATARAX) 10 mg, Oral, 3 Times Daily PRN    sertraline (ZOLOFT) 50 mg, Oral, Daily       There are no discontinued medications.

## 2024-09-17 ENCOUNTER — OFFICE VISIT (OUTPATIENT)
Dept: FAMILY MEDICINE CLINIC | Facility: CLINIC | Age: 29
End: 2024-09-17
Payer: COMMERCIAL

## 2024-09-17 VITALS
BODY MASS INDEX: 33.49 KG/M2 | TEMPERATURE: 98.1 F | OXYGEN SATURATION: 97 % | SYSTOLIC BLOOD PRESSURE: 114 MMHG | HEIGHT: 68 IN | WEIGHT: 221 LBS | HEART RATE: 67 BPM | DIASTOLIC BLOOD PRESSURE: 70 MMHG

## 2024-09-17 DIAGNOSIS — M54.42 ACUTE LEFT-SIDED LOW BACK PAIN WITH LEFT-SIDED SCIATICA: Primary | ICD-10-CM

## 2024-09-17 PROCEDURE — 99213 OFFICE O/P EST LOW 20 MIN: CPT | Performed by: FAMILY MEDICINE

## 2024-09-17 RX ORDER — PREDNISONE 20 MG/1
60 TABLET ORAL DAILY
Qty: 15 TABLET | Refills: 0 | Status: SHIPPED | OUTPATIENT
Start: 2024-09-17 | End: 2024-09-23

## 2024-09-17 RX ORDER — LIDOCAINE 50 MG/G
1 PATCH TOPICAL EVERY 24 HOURS
Qty: 30 PATCH | Refills: 1 | Status: SHIPPED | OUTPATIENT
Start: 2024-09-17

## 2024-09-17 RX ORDER — CYCLOBENZAPRINE HCL 5 MG
5 TABLET ORAL 3 TIMES DAILY PRN
Qty: 30 TABLET | Refills: 0 | Status: SHIPPED | OUTPATIENT
Start: 2024-09-17 | End: 2024-09-23

## 2024-09-23 ENCOUNTER — OFFICE VISIT (OUTPATIENT)
Dept: FAMILY MEDICINE CLINIC | Facility: CLINIC | Age: 29
End: 2024-09-23
Payer: COMMERCIAL

## 2024-09-23 VITALS
BODY MASS INDEX: 33.49 KG/M2 | OXYGEN SATURATION: 98 % | HEIGHT: 68 IN | TEMPERATURE: 98.3 F | DIASTOLIC BLOOD PRESSURE: 80 MMHG | HEART RATE: 68 BPM | WEIGHT: 221 LBS | SYSTOLIC BLOOD PRESSURE: 116 MMHG

## 2024-09-23 DIAGNOSIS — M54.42 ACUTE LEFT-SIDED LOW BACK PAIN WITH LEFT-SIDED SCIATICA: Primary | ICD-10-CM

## 2024-09-23 PROCEDURE — 99213 OFFICE O/P EST LOW 20 MIN: CPT | Performed by: FAMILY MEDICINE

## 2024-09-23 RX ORDER — METHOCARBAMOL 500 MG/1
500 TABLET, FILM COATED ORAL 4 TIMES DAILY PRN
Qty: 120 TABLET | Refills: 0 | Status: SHIPPED | OUTPATIENT
Start: 2024-09-23

## 2024-10-03 ENCOUNTER — OFFICE VISIT (OUTPATIENT)
Dept: FAMILY MEDICINE CLINIC | Facility: CLINIC | Age: 29
End: 2024-10-03
Payer: COMMERCIAL

## 2024-10-03 VITALS
BODY MASS INDEX: 33.6 KG/M2 | OXYGEN SATURATION: 96 % | DIASTOLIC BLOOD PRESSURE: 80 MMHG | TEMPERATURE: 98.1 F | SYSTOLIC BLOOD PRESSURE: 120 MMHG | HEART RATE: 77 BPM | WEIGHT: 221.7 LBS | HEIGHT: 68 IN

## 2024-10-03 DIAGNOSIS — H61.22 LEFT EAR IMPACTED CERUMEN: Primary | ICD-10-CM

## 2024-10-03 PROCEDURE — 99213 OFFICE O/P EST LOW 20 MIN: CPT | Performed by: FAMILY MEDICINE

## 2024-10-03 RX ORDER — CIPROFLOXACIN AND DEXAMETHASONE 3; 1 MG/ML; MG/ML
4 SUSPENSION/ DROPS AURICULAR (OTIC) 2 TIMES DAILY
Qty: 7.5 ML | Refills: 0 | Status: SHIPPED | OUTPATIENT
Start: 2024-10-03 | End: 2024-10-10

## 2024-10-03 NOTE — PROGRESS NOTES
Chief Complaint  Annual Exam, FMLA paperwork, and Ear Fullness (Left ear)    Subjective          Mando Hawk presents to [facility]  History of Present Illness  Filled out paperwork for fmla- went back to work on 9/30/2024    Back pain much better  Ear Fullness   There is pain in the left ear. This is a new problem. The current episode started in the past 7 days. The problem occurs constantly. The problem has been unchanged. There has been no fever. The pain is mild. Pertinent negatives include no abdominal pain, coughing, diarrhea, drainage, headaches, hearing loss, neck pain, rash, rhinorrhea, sore throat or vomiting. He has tried nothing for the symptoms.                Objective   No Known Allergies  Immunization History   Administered Date(s) Administered    COVID-19 (MODERNA) 1st,2nd,3rd Dose Monovalent 05/21/2021, 06/26/2021    COVID-19 (MODERNA) Monovalent Original Booster 01/27/2022    DTP / HiB 08/13/1998    DTaP, Unspecified 04/10/1996    HPV Quadrivalent 05/23/2014    HPV, Unspecified 05/23/2014    Hepatitis A 05/23/2014    MMR 08/13/1998    Meningococcal MCV4P (Menactra) 05/23/2014    Meningococcal Polysaccharide 05/23/2014    OPV 08/13/1998    Td (TDVAX) 07/28/2005    Tdap 05/23/2014     Past Medical History:   Diagnosis Date    Anxiety     Depression     GERD (gastroesophageal reflux disease)       Past Surgical History:   Procedure Laterality Date    COLONOSCOPY  11/2023    ENDOSCOPY  11/2023    WISDOM TOOTH EXTRACTION        Social History     Socioeconomic History    Marital status: Single   Tobacco Use    Smoking status: Never     Passive exposure: Past    Smokeless tobacco: Never   Vaping Use    Vaping status: Never Used   Substance and Sexual Activity    Alcohol use: Not Currently     Comment: soc.    Drug use: Yes     Types: Marijuana    Sexual activity: Not Currently        Current Outpatient Medications:     diclofenac (VOLTAREN) 50 MG EC tablet, Take  "1 tablet by mouth 2 (Two) Times a Day., Disp: 60 tablet, Rfl: 0    hydrOXYzine (ATARAX) 10 MG tablet, Take 1 tablet by mouth 3 (Three) Times a Day As Needed for Anxiety., Disp: 90 tablet, Rfl: 1    lidocaine (Lidoderm) 5 %, Place 1 patch on the skin as directed by provider Daily. Remove & Discard patch within 12 hours or as directed by MD, Disp: 30 patch, Rfl: 1    methocarbamol (ROBAXIN) 500 MG tablet, Take 1 tablet by mouth 4 (Four) Times a Day As Needed for Muscle Spasms., Disp: 120 tablet, Rfl: 0    sertraline (Zoloft) 50 MG tablet, Take 1 tablet by mouth Daily., Disp: 30 tablet, Rfl: 1    ciprofloxacin-dexAMETHasone (Ciprodex) 0.3-0.1 % otic suspension, Administer 4 drops into the left ear 2 (Two) Times a Day for 7 days., Disp: 7.5 mL, Rfl: 0   Family History   Problem Relation Age of Onset    No Known Problems Mother     No Known Problems Father     No Known Problems Sister     No Known Problems Sister     Colon cancer Neg Hx     Colon polyps Neg Hx     Crohn's disease Neg Hx     Irritable bowel syndrome Neg Hx     Ulcerative colitis Neg Hx           Vital Signs:   Vitals:    10/03/24 1451   BP: 120/80   Pulse: 77   Temp: 98.1 °F (36.7 °C)   SpO2: 96%   Weight: 101 kg (221 lb 11.2 oz)   Height: 171.5 cm (67.5\")       Review of Systems   Constitutional:  Negative for fatigue and fever.   HENT:  Negative for congestion, hearing loss, hoarse voice, rhinorrhea, sore throat and tinnitus.    Eyes:  Negative for visual disturbance.   Respiratory:  Negative for cough, chest tightness, shortness of breath and wheezing.    Cardiovascular:  Negative for chest pain, palpitations and leg swelling.   Gastrointestinal:  Negative for abdominal pain, diarrhea, nausea and vomiting.   Musculoskeletal:  Negative for neck pain.   Skin:  Negative for rash.   Neurological:  Negative for dizziness, light-headedness and headaches.   Hematological:  Negative for adenopathy.      Physical Exam  Vitals reviewed.   Constitutional:       " Appearance: Normal appearance. He is well-developed.   HENT:      Head: Normocephalic and atraumatic.      Right Ear: Tympanic membrane, ear canal and external ear normal.      Left Ear: External ear normal.      Ears:      Comments: Left ear cerumen impaction    Left cerumen impaction cleared- left TM clear.     Mouth/Throat:      Mouth: Mucous membranes are moist.      Pharynx: Oropharynx is clear. No oropharyngeal exudate or posterior oropharyngeal erythema.   Eyes:      Extraocular Movements: Extraocular movements intact.      Conjunctiva/sclera: Conjunctivae normal.      Pupils: Pupils are equal, round, and reactive to light.   Cardiovascular:      Rate and Rhythm: Normal rate and regular rhythm.      Pulses: Normal pulses.      Heart sounds: Normal heart sounds. No murmur heard.     No friction rub. No gallop.   Pulmonary:      Effort: Pulmonary effort is normal.      Breath sounds: Normal breath sounds. No wheezing or rhonchi.   Abdominal:      General: Abdomen is flat. Bowel sounds are normal. There is no distension.      Palpations: Abdomen is soft. There is no mass.      Tenderness: There is no abdominal tenderness. There is no guarding or rebound.      Hernia: No hernia is present.   Musculoskeletal:         General: Normal range of motion.      Cervical back: Normal range of motion and neck supple.   Skin:     General: Skin is warm and dry.      Capillary Refill: Capillary refill takes less than 2 seconds.   Neurological:      General: No focal deficit present.      Mental Status: He is alert and oriented to person, place, and time.      Cranial Nerves: No cranial nerve deficit.   Psychiatric:         Mood and Affect: Mood and affect normal.         Behavior: Behavior normal.         Thought Content: Thought content normal.         Judgment: Judgment normal.        Result Review :                 Assessment and Plan    Diagnoses and all orders for this visit:    1. Left ear impacted cerumen (Primary)  -      ciprofloxacin-dexAMETHasone (Ciprodex) 0.3-0.1 % otic suspension; Administer 4 drops into the left ear 2 (Two) Times a Day for 7 days.  Dispense: 7.5 mL; Refill: 0            Follow Up   Return if symptoms worsen or fail to improve.  Patient was given instructions and counseling regarding his condition or for health maintenance advice. Please see specific information pulled into the AVS if appropriate.

## 2025-08-04 ENCOUNTER — OFFICE VISIT (OUTPATIENT)
Dept: FAMILY MEDICINE CLINIC | Facility: CLINIC | Age: 30
End: 2025-08-04
Payer: COMMERCIAL

## 2025-08-04 VITALS
DIASTOLIC BLOOD PRESSURE: 68 MMHG | WEIGHT: 227 LBS | OXYGEN SATURATION: 99 % | BODY MASS INDEX: 35.03 KG/M2 | HEART RATE: 104 BPM | SYSTOLIC BLOOD PRESSURE: 112 MMHG | TEMPERATURE: 98.6 F

## 2025-08-04 DIAGNOSIS — H61.23 HEARING LOSS DUE TO CERUMEN IMPACTION, BILATERAL: Primary | ICD-10-CM

## 2025-08-04 PROCEDURE — 99213 OFFICE O/P EST LOW 20 MIN: CPT | Performed by: NURSE PRACTITIONER

## 2025-08-04 PROCEDURE — 69209 REMOVE IMPACTED EAR WAX UNI: CPT | Performed by: NURSE PRACTITIONER
